# Patient Record
Sex: MALE | Race: ASIAN | NOT HISPANIC OR LATINO
[De-identification: names, ages, dates, MRNs, and addresses within clinical notes are randomized per-mention and may not be internally consistent; named-entity substitution may affect disease eponyms.]

---

## 2020-05-11 ENCOUNTER — APPOINTMENT (OUTPATIENT)
Age: 60
End: 2020-05-11
Payer: COMMERCIAL

## 2020-05-11 ENCOUNTER — TRANSCRIPTION ENCOUNTER (OUTPATIENT)
Age: 60
End: 2020-05-11

## 2020-05-11 VITALS — TEMPERATURE: 97.6 F

## 2020-05-11 DIAGNOSIS — Z87.09 PERSONAL HISTORY OF OTHER DISEASES OF THE RESPIRATORY SYSTEM: ICD-10-CM

## 2020-05-11 DIAGNOSIS — R09.81 NASAL CONGESTION: ICD-10-CM

## 2020-05-11 DIAGNOSIS — Z78.9 OTHER SPECIFIED HEALTH STATUS: ICD-10-CM

## 2020-05-11 PROCEDURE — 99203 OFFICE O/P NEW LOW 30 MIN: CPT | Mod: 95

## 2020-05-11 RX ORDER — DOXYCYCLINE 100 MG/1
100 CAPSULE ORAL
Qty: 20 | Refills: 0 | Status: ACTIVE | COMMUNITY
Start: 2020-05-11 | End: 1900-01-01

## 2020-05-11 RX ORDER — METHYLPREDNISOLONE 4 MG/1
4 TABLET ORAL
Qty: 1 | Refills: 0 | Status: ACTIVE | COMMUNITY
Start: 2020-05-11 | End: 1900-01-01

## 2020-05-11 NOTE — HISTORY OF PRESENT ILLNESS
[FreeTextEntry8] : pt. seen for acute visit, c/o cough for a week with sinus congestion, nasal stuffiness, hARD TO BREATHE. NO FEVER, CHILLS, CHEST PAIN, SORE THROAT, EAR PAIN. He had sinus infection last year too and suffers from seasonal allergies. He is taking Allgre for couple weeks and using jose pot. c/o yellow mucus. Daughter Bryant is .

## 2020-05-11 NOTE — REVIEW OF SYSTEMS
[Hearing Loss] : no hearing loss [Earache] : no earache [Shortness Of Breath] : no shortness of breath [Wheezing] : no wheezing

## 2020-05-11 NOTE — PLAN
[FreeTextEntry1] : Doxycycline and Medrol dose pack. \par Flonase nasal spray.\par  Allegra for allergies, allergist referral. daughter is asking about allergy shots.\par  f/u in office for CPE -pt. requested 06/12/2020 at 9 am.

## 2020-05-12 ENCOUNTER — APPOINTMENT (OUTPATIENT)
Dept: FAMILY MEDICINE | Facility: CLINIC | Age: 60
End: 2020-05-12

## 2020-06-12 ENCOUNTER — APPOINTMENT (OUTPATIENT)
Dept: FAMILY MEDICINE | Facility: CLINIC | Age: 60
End: 2020-06-12

## 2020-12-23 PROBLEM — Z87.09 HISTORY OF ACUTE SINUSITIS: Status: RESOLVED | Noted: 2020-05-11 | Resolved: 2020-12-23

## 2021-03-01 ENCOUNTER — APPOINTMENT (OUTPATIENT)
Dept: PHARMACY | Facility: CLINIC | Age: 61
End: 2021-03-01

## 2021-03-12 ENCOUNTER — LABORATORY RESULT (OUTPATIENT)
Age: 61
End: 2021-03-12

## 2021-03-12 ENCOUNTER — APPOINTMENT (OUTPATIENT)
Dept: INTERNAL MEDICINE | Facility: CLINIC | Age: 61
End: 2021-03-12
Payer: COMMERCIAL

## 2021-03-12 VITALS
OXYGEN SATURATION: 98 % | BODY MASS INDEX: 23.54 KG/M2 | HEART RATE: 68 BPM | HEIGHT: 67 IN | DIASTOLIC BLOOD PRESSURE: 90 MMHG | TEMPERATURE: 98.9 F | WEIGHT: 150 LBS | SYSTOLIC BLOOD PRESSURE: 156 MMHG

## 2021-03-12 VITALS — SYSTOLIC BLOOD PRESSURE: 162 MMHG | DIASTOLIC BLOOD PRESSURE: 90 MMHG

## 2021-03-12 DIAGNOSIS — Z80.0 FAMILY HISTORY OF MALIGNANT NEOPLASM OF DIGESTIVE ORGANS: ICD-10-CM

## 2021-03-12 DIAGNOSIS — Z23 ENCOUNTER FOR IMMUNIZATION: ICD-10-CM

## 2021-03-12 DIAGNOSIS — R76.8 OTHER SPECIFIED ABNORMAL IMMUNOLOGICAL FINDINGS IN SERUM: ICD-10-CM

## 2021-03-12 PROCEDURE — 99072 ADDL SUPL MATRL&STAF TM PHE: CPT

## 2021-03-12 PROCEDURE — 99204 OFFICE O/P NEW MOD 45 MIN: CPT

## 2021-03-12 NOTE — HISTORY OF PRESENT ILLNESS
[Other: _____] : [unfilled] [FreeTextEntry8] : Patient presented for the first time for transition of care, he's looking for a new PCP.  Last PE was in 2019.\par \par Pt c/o nocturia for the last 3 years.  He wakes up almost every hour per night, about 6-7 x per night.  In the summer, it's not as bad, and wake up 5 x per night.  This only occurs in the [place].  When he goes back to [facility], he only wakes up 2x per night.  No dysuria, but flow is very slow.  During the day, he goes once every 3 hours or so.   He denied caffeine, alcohol  use, and usually drink most. \par \par He has allergic rhinitis in the Spring.  He takes a pill and a nasal spray with relief. \par \par BP occ elevated at home 140-150/80-90.\par \par Pt got the COVID vaccine, but declined flu vaccine.

## 2021-03-12 NOTE — PHYSICAL EXAM
[No Acute Distress] : no acute distress [Well Nourished] : well nourished [Well Developed] : well developed [Normal Sclera/Conjunctiva] : normal sclera/conjunctiva [PERRL] : pupils equal round and reactive to light [EOMI] : extraocular movements intact [Normal Outer Ear/Nose] : the outer ears and nose were normal in appearance [Normal TMs] : both tympanic membranes were normal [No JVD] : no jugular venous distention [No Lymphadenopathy] : no lymphadenopathy [Supple] : supple [No Respiratory Distress] : no respiratory distress  [Clear to Auscultation] : lungs were clear to auscultation bilaterally [Normal Rate] : normal rate  [Regular Rhythm] : with a regular rhythm [Normal S1, S2] : normal S1 and S2 [No Edema] : there was no peripheral edema [No Extremity Clubbing/Cyanosis] : no extremity clubbing/cyanosis [Soft] : abdomen soft [Non Tender] : non-tender [Normal Bowel Sounds] : normal bowel sounds [Normal Sphincter Tone] : normal sphincter tone [No Mass] : no mass [Prostate Enlarged] : was enlarged [Normal Supraclavicular Nodes] : no supraclavicular lymphadenopathy [Normal Posterior Cervical Nodes] : no posterior cervical lymphadenopathy [Normal Anterior Cervical Nodes] : no anterior cervical lymphadenopathy [No CVA Tenderness] : no CVA  tenderness [No Spinal Tenderness] : no spinal tenderness [Speech Grossly Normal] : speech grossly normal [Normal Affect] : the affect was normal [Alert and Oriented x3] : oriented to person, place, and time [Normal Mood] : the mood was normal [Stool Occult Blood] : stool negative for occult blood [Prostate Nodule] : did not have a nodule [Prostate Tenderness] : was not tender [de-identified] : male in stated age,  [de-identified] : Pt was fluent in [place]

## 2021-03-12 NOTE — ASSESSMENT
[FreeTextEntry1] : Pt is overdue for colonoscopy, and will need to consult with GI.  He also needs routine eye exam and dental care.  I gave him Rx to check routine labs, which he will do prior to next OV which should be scheduled as a CPE.

## 2021-03-15 LAB
25(OH)D3 SERPL-MCNC: 35.7 NG/ML
ALBUMIN SERPL ELPH-MCNC: 4.3 G/DL
ALP BLD-CCNC: 86 U/L
ALT SERPL-CCNC: 17 U/L
ANION GAP SERPL CALC-SCNC: 8 MMOL/L
APPEARANCE: CLEAR
AST SERPL-CCNC: 20 U/L
BASOPHILS # BLD AUTO: 0.05 K/UL
BASOPHILS NFR BLD AUTO: 0.8 %
BILIRUB SERPL-MCNC: 1.2 MG/DL
BILIRUBIN URINE: NEGATIVE
BLOOD URINE: ABNORMAL
BUN SERPL-MCNC: 12 MG/DL
CALCIUM SERPL-MCNC: 9.1 MG/DL
CHLORIDE SERPL-SCNC: 104 MMOL/L
CHOLEST SERPL-MCNC: 207 MG/DL
CO2 SERPL-SCNC: 28 MMOL/L
COLOR: NORMAL
CREAT SERPL-MCNC: 0.83 MG/DL
EOSINOPHIL # BLD AUTO: 0.09 K/UL
EOSINOPHIL NFR BLD AUTO: 1.4 %
ESTIMATED AVERAGE GLUCOSE: 103 MG/DL
GLUCOSE QUALITATIVE U: NEGATIVE
GLUCOSE SERPL-MCNC: 88 MG/DL
HBA1C MFR BLD HPLC: 5.2 %
HCT VFR BLD CALC: 45.7 %
HDLC SERPL-MCNC: 39 MG/DL
HGB BLD-MCNC: 15 G/DL
IMM GRANULOCYTES NFR BLD AUTO: 0.3 %
KETONES URINE: NEGATIVE
LDLC SERPL CALC-MCNC: 136 MG/DL
LEUKOCYTE ESTERASE URINE: NEGATIVE
LYMPHOCYTES # BLD AUTO: 2.01 K/UL
LYMPHOCYTES NFR BLD AUTO: 30.2 %
MAN DIFF?: NORMAL
MCHC RBC-ENTMCNC: 29 PG
MCHC RBC-ENTMCNC: 32.8 GM/DL
MCV RBC AUTO: 88.4 FL
MONOCYTES # BLD AUTO: 0.46 K/UL
MONOCYTES NFR BLD AUTO: 6.9 %
NEUTROPHILS # BLD AUTO: 4.02 K/UL
NEUTROPHILS NFR BLD AUTO: 60.4 %
NITRITE URINE: NEGATIVE
NONHDLC SERPL-MCNC: 168 MG/DL
PH URINE: 6
PLATELET # BLD AUTO: 279 K/UL
POTASSIUM SERPL-SCNC: 4 MMOL/L
PROT SERPL-MCNC: 7.3 G/DL
PROTEIN URINE: NEGATIVE
PSA SERPL-MCNC: 0.73 NG/ML
RBC # BLD: 5.17 M/UL
RBC # FLD: 12 %
SODIUM SERPL-SCNC: 139 MMOL/L
SPECIFIC GRAVITY URINE: 1.01
TRIGL SERPL-MCNC: 161 MG/DL
UROBILINOGEN URINE: NORMAL
WBC # FLD AUTO: 6.65 K/UL

## 2021-04-01 ENCOUNTER — APPOINTMENT (OUTPATIENT)
Dept: INTERNAL MEDICINE | Facility: CLINIC | Age: 61
End: 2021-04-01
Payer: COMMERCIAL

## 2021-04-01 ENCOUNTER — NON-APPOINTMENT (OUTPATIENT)
Age: 61
End: 2021-04-01

## 2021-04-01 VITALS
OXYGEN SATURATION: 98 % | SYSTOLIC BLOOD PRESSURE: 114 MMHG | HEART RATE: 81 BPM | TEMPERATURE: 98.1 F | BODY MASS INDEX: 23.54 KG/M2 | HEIGHT: 67 IN | WEIGHT: 150 LBS | DIASTOLIC BLOOD PRESSURE: 72 MMHG

## 2021-04-01 VITALS — DIASTOLIC BLOOD PRESSURE: 76 MMHG | SYSTOLIC BLOOD PRESSURE: 112 MMHG

## 2021-04-01 DIAGNOSIS — Z78.9 OTHER SPECIFIED HEALTH STATUS: ICD-10-CM

## 2021-04-01 DIAGNOSIS — J47.9 BRONCHIECTASIS, UNCOMPLICATED: ICD-10-CM

## 2021-04-01 DIAGNOSIS — Z00.00 ENCOUNTER FOR GENERAL ADULT MEDICAL EXAMINATION W/OUT ABNORMAL FINDINGS: ICD-10-CM

## 2021-04-01 DIAGNOSIS — Z87.891 PERSONAL HISTORY OF NICOTINE DEPENDENCE: ICD-10-CM

## 2021-04-01 PROCEDURE — 99396 PREV VISIT EST AGE 40-64: CPT

## 2021-04-01 PROCEDURE — 99072 ADDL SUPL MATRL&STAF TM PHE: CPT

## 2021-04-01 NOTE — ASSESSMENT
[FreeTextEntry1] : Patient was again reminded that he needs a screening colonoscopy, and that he should consult with GI.  He was reminded to have routine eye exam and dental care.

## 2021-04-01 NOTE — REVIEW OF SYSTEMS
[Nocturia] : nocturia [Negative] : Heme/Lymph [Fever] : no fever [Fatigue] : no fatigue [Chills] : no chills [Recent Change In Weight] : ~T no recent weight change [Chest Pain] : no chest pain [Palpitations] : no palpitations [Claudication] : no  leg claudication [Paroxysmal Nocturnal Dyspnea] : no paroxysmal nocturnal dyspnea [Wheezing] : no wheezing [Shortness Of Breath] : no shortness of breath [Cough] : no cough [Dyspnea on Exertion] : not dyspnea on exertion [Abdominal Pain] : no abdominal pain [Nausea] : no nausea [Constipation] : no constipation [Diarrhea] : no diarrhea [Vomiting] : no vomiting [Heartburn] : no heartburn [Melena] : no melena [Dysuria] : no dysuria [Incontinence] : no incontinence [Hematuria] : no hematuria [Joint Pain] : no joint pain [Joint Stiffness] : no joint stiffness [Muscle Pain] : no muscle pain [Back Pain] : no back pain [Joint Swelling] : no joint swelling [Itching] : no itching [Mole Changes] : no mole changes [Skin Rash] : no skin rash [Headache] : no headache [Dizziness] : no dizziness [Fainting] : no fainting [Unsteady Walk] : no ataxia [Insomnia] : no insomnia [Anxiety] : no anxiety [Depression] : no depression [Easy Bleeding] : no easy bleeding [Easy Bruising] : no easy bruising [Swollen Glands] : no swollen glands [FreeTextEntry4] : tinnitus, [FreeTextEntry3] : wears reading glasses [FreeTextEntry8] : Nocturia of 3-4 X per night,

## 2021-04-01 NOTE — HEALTH RISK ASSESSMENT
[Excellent] : ~his/her~  mood as  excellent [Never (0 pts)] : Never (0 points) [No] : In the past 12 months have you used drugs other than those required for medical reasons? No [No falls in past year] : Patient reported no falls in the past year [0] : 2) Feeling down, depressed, or hopeless: Not at all (0) [None] : None [With Family] : lives with family [# of Members in Household ___] :  household currently consist of [unfilled] member(s) [Retired] : retired [Less Than High School] : less than high school [] :  [# Of Children ___] : has [unfilled] children [Feels Safe at Home] : Feels safe at home [Fully functional (bathing, dressing, toileting, transferring, walking, feeding)] : Fully functional (bathing, dressing, toileting, transferring, walking, feeding) [Fully functional (using the telephone, shopping, preparing meals, housekeeping, doing laundry, using] : Fully functional and needs no help or supervision to perform IADLs (using the telephone, shopping, preparing meals, housekeeping, doing laundry, using transportation, managing medications and managing finances) [Smoke Detector] : smoke detector [Carbon Monoxide Detector] : carbon monoxide detector [Seat Belt] :  uses seat belt [] : No [de-identified] : walks every day [[id number]] : 0 [EyeExamDate] : 2019 [Change in mental status noted] : No change in mental status noted [Reports changes in hearing] : Reports no changes in hearing [Reports changes in vision] : Reports no changes in vision [Reports changes in dental health] : Reports no changes in dental health [ColonoscopyDate] : Never [de-identified] : dentist - 2018

## 2021-04-01 NOTE — PHYSICAL EXAM
[No Acute Distress] : no acute distress [Well Nourished] : well nourished [Well Developed] : well developed [Normal Sclera/Conjunctiva] : normal sclera/conjunctiva [PERRL] : pupils equal round and reactive to light [EOMI] : extraocular movements intact [Normal Outer Ear/Nose] : the outer ears and nose were normal in appearance [Normal Oropharynx] : the oropharynx was normal [Normal TMs] : both tympanic membranes were normal [Normal Nasal Mucosa] : the nasal mucosa was normal [No JVD] : no jugular venous distention [No Lymphadenopathy] : no lymphadenopathy [Supple] : supple [No Respiratory Distress] : no respiratory distress  [Clear to Auscultation] : lungs were clear to auscultation bilaterally [Normal Rate] : normal rate  [Regular Rhythm] : with a regular rhythm [Normal S1, S2] : normal S1 and S2 [No Carotid Bruits] : no carotid bruits [Pedal Pulses Present] : the pedal pulses are present [No Edema] : there was no peripheral edema [No Extremity Clubbing/Cyanosis] : no extremity clubbing/cyanosis [Normal Appearance] : normal in appearance [No Masses] : no palpable masses [No Nipple Discharge] : no nipple discharge [No Axillary Lymphadenopathy] : no axillary lymphadenopathy [Soft] : abdomen soft [Non Tender] : non-tender [Non-distended] : non-distended [Normal Bowel Sounds] : normal bowel sounds [Declined Rectal Exam] : declined rectal exam [Normal Supraclavicular Nodes] : no supraclavicular lymphadenopathy [Normal Axillary Nodes] : no axillary lymphadenopathy [Normal Posterior Cervical Nodes] : no posterior cervical lymphadenopathy [Normal Anterior Cervical Nodes] : no anterior cervical lymphadenopathy [No CVA Tenderness] : no CVA  tenderness [No Spinal Tenderness] : no spinal tenderness [No Joint Swelling] : no joint swelling [Grossly Normal Strength/Tone] : grossly normal strength/tone [No Rash] : no rash [Coordination Grossly Intact] : coordination grossly intact [No Focal Deficits] : no focal deficits [Normal Gait] : normal gait [Speech Grossly Normal] : speech grossly normal [Normal Affect] : the affect was normal [Alert and Oriented x3] : oriented to person, place, and time [Normal Mood] : the mood was normal [de-identified] : male in stated age,  [FreeTextEntry1] : deferred, exam at last OV 3 weeks ago,

## 2021-04-01 NOTE — HISTORY OF PRESENT ILLNESS
[Other: _____] : [unfilled] [FreeTextEntry1] : Pt presented for PE.  Last PE was in 2019 with another MD. [de-identified] : Pt was placed on Cardura  about 3 weeks ago when we first met for HTN and LUTS, he tolerated meds well without any SE.   It reduced the frequency of nocturia to 3-4 x per night, was previously 5-6 x per night.\par \par He has been taking Claritin and that is helping his allergy symptoms.\par \par Pt feels well without any other complaint.

## 2021-04-06 RX ORDER — DOXAZOSIN 2 MG/1
2 TABLET ORAL DAILY
Qty: 90 | Refills: 1 | Status: ACTIVE | COMMUNITY
Start: 2021-03-12 | End: 1900-01-01

## 2021-04-18 ENCOUNTER — INPATIENT (INPATIENT)
Facility: HOSPITAL | Age: 61
LOS: 1 days | Discharge: HOME | DRG: 603 | End: 2021-04-20
Attending: HOSPITALIST | Admitting: HOSPITALIST
Payer: COMMERCIAL

## 2021-04-18 VITALS
HEART RATE: 76 BPM | OXYGEN SATURATION: 98 % | RESPIRATION RATE: 14 BRPM | WEIGHT: 149.03 LBS | TEMPERATURE: 98 F | DIASTOLIC BLOOD PRESSURE: 78 MMHG | HEIGHT: 67 IN | SYSTOLIC BLOOD PRESSURE: 114 MMHG

## 2021-04-18 DIAGNOSIS — L03.90 CELLULITIS, UNSPECIFIED: ICD-10-CM

## 2021-04-18 LAB
ALBUMIN SERPL ELPH-MCNC: 3.5 G/DL — SIGNIFICANT CHANGE UP (ref 3.3–5)
ALP SERPL-CCNC: 86 U/L — SIGNIFICANT CHANGE UP (ref 30–120)
ALT FLD-CCNC: 30 U/L DA — SIGNIFICANT CHANGE UP (ref 10–60)
ANION GAP SERPL CALC-SCNC: 7 MMOL/L — SIGNIFICANT CHANGE UP (ref 5–17)
APPEARANCE UR: CLEAR — SIGNIFICANT CHANGE UP
APTT BLD: 31.5 SEC — SIGNIFICANT CHANGE UP (ref 27.5–35.5)
AST SERPL-CCNC: 19 U/L — SIGNIFICANT CHANGE UP (ref 10–40)
BACTERIA # UR AUTO: ABNORMAL
BASOPHILS # BLD AUTO: 0.06 K/UL — SIGNIFICANT CHANGE UP (ref 0–0.2)
BASOPHILS NFR BLD AUTO: 0.5 % — SIGNIFICANT CHANGE UP (ref 0–2)
BILIRUB SERPL-MCNC: 1.1 MG/DL — SIGNIFICANT CHANGE UP (ref 0.2–1.2)
BILIRUB UR-MCNC: NEGATIVE — SIGNIFICANT CHANGE UP
BUN SERPL-MCNC: 14 MG/DL — SIGNIFICANT CHANGE UP (ref 7–23)
CALCIUM SERPL-MCNC: 8.8 MG/DL — SIGNIFICANT CHANGE UP (ref 8.4–10.5)
CHLORIDE SERPL-SCNC: 103 MMOL/L — SIGNIFICANT CHANGE UP (ref 96–108)
CO2 SERPL-SCNC: 27 MMOL/L — SIGNIFICANT CHANGE UP (ref 22–31)
COD CRY URNS QL: ABNORMAL
COLOR SPEC: YELLOW — SIGNIFICANT CHANGE UP
CREAT SERPL-MCNC: 0.98 MG/DL — SIGNIFICANT CHANGE UP (ref 0.5–1.3)
CRP SERPL-MCNC: 37 MG/L — HIGH
DIFF PNL FLD: NEGATIVE — SIGNIFICANT CHANGE UP
EOSINOPHIL # BLD AUTO: 0.12 K/UL — SIGNIFICANT CHANGE UP (ref 0–0.5)
EOSINOPHIL NFR BLD AUTO: 1 % — SIGNIFICANT CHANGE UP (ref 0–6)
GLUCOSE SERPL-MCNC: 121 MG/DL — HIGH (ref 70–99)
GLUCOSE UR QL: NEGATIVE MG/DL — SIGNIFICANT CHANGE UP
HCT VFR BLD CALC: 43.3 % — SIGNIFICANT CHANGE UP (ref 39–50)
HGB BLD-MCNC: 14.5 G/DL — SIGNIFICANT CHANGE UP (ref 13–17)
IMM GRANULOCYTES NFR BLD AUTO: 0.4 % — SIGNIFICANT CHANGE UP (ref 0–1.5)
INR BLD: 1.1 RATIO — SIGNIFICANT CHANGE UP (ref 0.88–1.16)
KETONES UR-MCNC: NEGATIVE — SIGNIFICANT CHANGE UP
LACTATE SERPL-SCNC: 0.8 MMOL/L — SIGNIFICANT CHANGE UP (ref 0.7–2)
LEUKOCYTE ESTERASE UR-ACNC: ABNORMAL
LYMPHOCYTES # BLD AUTO: 19.4 % — SIGNIFICANT CHANGE UP (ref 13–44)
LYMPHOCYTES # BLD AUTO: 2.38 K/UL — SIGNIFICANT CHANGE UP (ref 1–3.3)
MCHC RBC-ENTMCNC: 29.4 PG — SIGNIFICANT CHANGE UP (ref 27–34)
MCHC RBC-ENTMCNC: 33.5 GM/DL — SIGNIFICANT CHANGE UP (ref 32–36)
MCV RBC AUTO: 87.8 FL — SIGNIFICANT CHANGE UP (ref 80–100)
MONOCYTES # BLD AUTO: 0.92 K/UL — HIGH (ref 0–0.9)
MONOCYTES NFR BLD AUTO: 7.5 % — SIGNIFICANT CHANGE UP (ref 2–14)
NEUTROPHILS # BLD AUTO: 8.75 K/UL — HIGH (ref 1.8–7.4)
NEUTROPHILS NFR BLD AUTO: 71.2 % — SIGNIFICANT CHANGE UP (ref 43–77)
NITRITE UR-MCNC: NEGATIVE — SIGNIFICANT CHANGE UP
NRBC # BLD: 0 /100 WBCS — SIGNIFICANT CHANGE UP (ref 0–0)
PH UR: 5 — SIGNIFICANT CHANGE UP (ref 5–8)
PLATELET # BLD AUTO: 240 K/UL — SIGNIFICANT CHANGE UP (ref 150–400)
POTASSIUM SERPL-MCNC: 3.9 MMOL/L — SIGNIFICANT CHANGE UP (ref 3.5–5.3)
POTASSIUM SERPL-SCNC: 3.9 MMOL/L — SIGNIFICANT CHANGE UP (ref 3.5–5.3)
PROT SERPL-MCNC: 7.8 G/DL — SIGNIFICANT CHANGE UP (ref 6–8.3)
PROT UR-MCNC: 30 MG/DL
PROTHROM AB SERPL-ACNC: 13.2 SEC — SIGNIFICANT CHANGE UP (ref 10.6–13.6)
RBC # BLD: 4.93 M/UL — SIGNIFICANT CHANGE UP (ref 4.2–5.8)
RBC # FLD: 12.1 % — SIGNIFICANT CHANGE UP (ref 10.3–14.5)
SARS-COV-2 RNA SPEC QL NAA+PROBE: SIGNIFICANT CHANGE UP
SODIUM SERPL-SCNC: 137 MMOL/L — SIGNIFICANT CHANGE UP (ref 135–145)
SP GR SPEC: 1.02 — SIGNIFICANT CHANGE UP (ref 1.01–1.02)
TSH SERPL-MCNC: 0.9 UIU/ML — SIGNIFICANT CHANGE UP (ref 0.27–4.2)
UROBILINOGEN FLD QL: 1 MG/DL
WBC # BLD: 12.28 K/UL — HIGH (ref 3.8–10.5)
WBC # FLD AUTO: 12.28 K/UL — HIGH (ref 3.8–10.5)
WBC UR QL: SIGNIFICANT CHANGE UP

## 2021-04-18 PROCEDURE — 93010 ELECTROCARDIOGRAM REPORT: CPT

## 2021-04-18 PROCEDURE — 99223 1ST HOSP IP/OBS HIGH 75: CPT

## 2021-04-18 PROCEDURE — 73080 X-RAY EXAM OF ELBOW: CPT | Mod: 26,RT

## 2021-04-18 PROCEDURE — 99285 EMERGENCY DEPT VISIT HI MDM: CPT

## 2021-04-18 PROCEDURE — 71045 X-RAY EXAM CHEST 1 VIEW: CPT | Mod: 26

## 2021-04-18 RX ORDER — VANCOMYCIN HCL 1 G
1000 VIAL (EA) INTRAVENOUS ONCE
Refills: 0 | Status: COMPLETED | OUTPATIENT
Start: 2021-04-18 | End: 2021-04-18

## 2021-04-18 RX ORDER — SODIUM CHLORIDE 9 MG/ML
2100 INJECTION INTRAMUSCULAR; INTRAVENOUS; SUBCUTANEOUS ONCE
Refills: 0 | Status: COMPLETED | OUTPATIENT
Start: 2021-04-18 | End: 2021-04-18

## 2021-04-18 RX ORDER — ENOXAPARIN SODIUM 100 MG/ML
40 INJECTION SUBCUTANEOUS EVERY 24 HOURS
Refills: 0 | Status: DISCONTINUED | OUTPATIENT
Start: 2021-04-18 | End: 2021-04-20

## 2021-04-18 RX ORDER — VANCOMYCIN HCL 1 G
1000 VIAL (EA) INTRAVENOUS EVERY 12 HOURS
Refills: 0 | Status: DISCONTINUED | OUTPATIENT
Start: 2021-04-18 | End: 2021-04-20

## 2021-04-18 RX ADMIN — ENOXAPARIN SODIUM 40 MILLIGRAM(S): 100 INJECTION SUBCUTANEOUS at 22:08

## 2021-04-18 RX ADMIN — Medication 1000 MILLIGRAM(S): at 12:25

## 2021-04-18 RX ADMIN — SODIUM CHLORIDE 2100 MILLILITER(S): 9 INJECTION INTRAMUSCULAR; INTRAVENOUS; SUBCUTANEOUS at 11:00

## 2021-04-18 RX ADMIN — SODIUM CHLORIDE 2100 MILLILITER(S): 9 INJECTION INTRAMUSCULAR; INTRAVENOUS; SUBCUTANEOUS at 12:26

## 2021-04-18 RX ADMIN — Medication 250 MILLIGRAM(S): at 22:08

## 2021-04-18 RX ADMIN — Medication 250 MILLIGRAM(S): at 11:00

## 2021-04-18 NOTE — PATIENT PROFILE ADULT - HISTORY OF COVID-19 VACCINATION
Soft-Textured, Child[place] Diet: Care Instructions  Your Care Instructions  A soft-textured, bland diet is used when you need food that is easy to chew, swallow, and digest. You will need to choose soft foods that are low in spices and seasonings. You will need to avoid high-fat foods, as well as caffeine and alcohol. Your doctor or dietitian can help you plan a soft-textured, bland diet based on your health and what you prefer to eat. Ask your doctor how long you should stay on this diet. As you get better, you will probably be able to go back to a regular diet. Talk with your doctor or dietitian before you make changes in your diet. Follow-up care is a key part of your treatment and safety. Be sure to make and go to all appointments, and call your doctor if you are having problems. Its also a good idea to know your test results and keep a list of the medicines you take. How can you care for yourself at home? · Choose foods that are easy to chew and swallow. Good choices are mashed potatoes, soft breads and rolls, cream soups, oatmeal, and Cream of Wheat. · Choose soft, well-cooked vegetables and soft or canned fruits. Good choices are applesauce, ripe bananas, and non-citrus fruit juice. · Try milk, yogurt, or other milk products, if you can digest dairy without too many problems. Your doctor may limit milk and milk products for a while. If so, he or she may recommend a calcium and vitamin D supplement. · Choose soft protein foods such as eggs, tofu, steamed fish, chicken, and turkey. Slow-cooking methods, such as stewing, will help soften meat. Chopping meat in a  or  also will make it easier to eat. · Avoid nuts, raw vegetables, hard crackers, tough meats, and prunes and prune juice. · Avoid foods that are very spicy, such as foods seasoned with black pepper, chili peppers, horseradish, or hot sauce.   · Avoid highly acidic foods such as citrus fruits, citrus fruit juices, and tomato-based foods. · Avoid high-fat foods such as fried meat, chips, and rich desserts. · Check with your doctor before you drink alcohol or beverages that have caffeine, such as coffee, tea, and cola beverages. Where can you learn more? Go to http://chet-artur.info/. Enter N293 in the search box to learn more about \"Soft-Textured, Glorya Bouillon Diet: Care Instructions. \"  Current as of: July 26, 2016  Content Version: 11.1  © 9897-7848 [employer]. Care instructions adapted under license by [employer] (which disclaims liability or warranty for this information). If you have questions about a medical condition or this instruction, always ask your healthcare professional. [address] any warranty or liability for your use of this information. Ranitidine (By mouth)   Ranitidine Hydrochloride ([id number]-ZO-[id number]-lovely joel-droe-KLOR-minoo)  Treats and prevents heartburn. Also treats stomach ulcers, gastroesophageal reflux disease (GERD), and conditions that cause too much stomach acid. Brand Name(s):DermaSilkRx Anodynexa John, DermacinRx Inflammatral John, FusePaq Deprizine, [facility] Acid Control 150, [facility] Staxxon, Leader Acid Control, Leader Ranitidine HCl, [facility] Acid Reducer, [employer] Acid Reducer, TopCare Heartburn Relief 150, TopCare Heartburn Relief 75, Zantac, Zantac 150, Zantac 300, Zantac 75   There may be other brand names for this medicine. When This Medicine Should Not Be Used: This medicine is not right for everyone. Do not use it if you had an allergic reaction to ranitidine. How to Use This Medicine:   Capsule, Tablet, Liquid, Fizzy Tablet, Liquid Filled Capsule, Granule  · Your doctor will tell you how much medicine to use. Do not use more than directed. · Follow the instructions on the medicine label if you are using this medicine without a prescription.   · Measure the oral liquid medicine with a marked measuring spoon, oral syringe, or medicine cup. · The effervescent tablet should not be chewed, swallowed whole, or dissolved on the tongue. The Zantac 25 EFFERdose Tablet should be mixed in 1 teaspoon (or more) of water. Do not drink the liquid until the tablet is completely dissolved. · If you are using this medicine to prevent heartburn, take it 30 to 60 minutes before you eat or drink anything that causes you to have heartburn. · Missed dose: Take a dose as soon as you remember. If it is almost time for your next dose, wait until then and take a regular dose. Do not take extra medicine to make up for a missed dose. · Store the medicine in a closed container at room temperature, away from heat, moisture, and direct light. You may store the oral liquid in the refrigerator. Drugs and Foods to Avoid:   Ask your doctor or pharmacist before using any other medicine, including over-the-counter medicines, vitamins, and herbal products. · Some medicines can affect how ranitidine works. Tell your doctor if you are using the following:   ¨ Atazanavir  ¨ Delavirdine  ¨ Gefitinib  ¨ Glipizide  ¨ Ketoconazole  ¨ Midazolam  ¨ Triazolam  ¨ Warfarin  Warnings While Using This Medicine:   · Tell your doctor if you are pregnant or breastfeeding, or if you have kidney disease, liver disease, or a history of acute porphyria. · EFFERdose® tablets contain phenylalanine. If you have phenylketonuria (PKU), talk to your doctor before you use this medicine. · Tell any doctor or dentist who treats you that you are using this medicine. This medicine may affect certain medical test results. · Keep all medicine out of the reach of children. Never share your medicine with anyone.   Possible Side Effects While Using This Medicine:   Call your doctor right away if you notice any of these side effects:  · Allergic reaction: Itching or hives, swelling in your face or hands, swelling or tingling in your mouth or throat, chest tightness, trouble breathing  · Blistering, peeling, red skin rash  · Dark urine or pale stools, nausea, vomiting, loss of appetite, stomach pain, yellow skin or eyes  · Fast, slow, or uneven heartbeat  · Unusual bleeding, bruising, or weakness  If you notice these less serious side effects, talk with your doctor:   · Constipation or diarrhea  · Headache  · Mild nausea, vomiting, or stomach pain  If you notice other side effects that you think are caused by this medicine, tell your doctor. Call your doctor for medical advice about side effects. You may report side effects to FDA at 7-374-QRI-3135  © 2016 [address] is for End User's use only and may not be sold, redistributed or otherwise used for commercial purposes. The above information is an  only. It is not intended as medical advice for individual conditions or treatments. Talk to your doctor, nurse or pharmacist before following any medical regimen to see if it is safe and effective for you. Yes

## 2021-04-18 NOTE — H&P ADULT - TIME BILLING
Greater than 75 minutes spent on patient encounter, activities included direct patient care, counceling and or coordinating care , reviewing notes, lab data/imaging, and discussion with multidiscplinary team

## 2021-04-18 NOTE — ED PROVIDER NOTE - OBJECTIVE STATEMENT
62 yo male with hx of HTN co abscess and infection rt elbow for several days. States through son-in-law as , he noted a painful red swelling with purulence rt elbow 3 days ago. Today abscess popped and swelling went down. Was seen at  today and recommended admission for IV antibiotics. Pt denies fever, pain, cough, or other symptom. Has PCP Tami in [place].   Had COVID vaccine last month.

## 2021-04-18 NOTE — H&P ADULT - NSHPLABSRESULTS_GEN_ALL_CORE
Labs:                          14.5   12.28 )-----------( 240      ( 18 Apr 2021 11:05 )             43.3     04-18    137  |  103  |  14  ----------------------------<  121<H>  3.9   |  27  |  0.98    Ca    8.8      18 Apr 2021 11:05    TPro  7.8  /  Alb  3.5  /  TBili  1.1  /  DBili  x   /  AST  19  /  ALT  30  /  AlkPhos  86  04-18    LIVER FUNCTIONS - ( 18 Apr 2021 11:05 )  Alb: 3.5 g/dL / Pro: 7.8 g/dL / ALK PHOS: 86 U/L / ALT: 30 U/L DA / AST: 19 U/L / GGT: x           PT/INR - ( 18 Apr 2021 11:05 )   PT: 13.2 sec;   INR: 1.10 ratio         PTT - ( 18 Apr 2021 11:05 )  PTT:31.5 sec      Active Medications  MEDICATIONS  (STANDING):  enoxaparin Injectable 40 milliGRAM(s) SubCutaneous every 24 hours  vancomycin  IVPB 1000 milliGRAM(s) IV Intermittent every 12 hours    MEDICATIONS  (PRN):

## 2021-04-18 NOTE — ED ADULT NURSE NOTE - OBJECTIVE STATEMENT
pt has skin tear to right a/c region and today noticed blister medial to it and it burst also with surrounding erythema and warmth oozing serous fluid  and pain radiates to right axillary region  redness marked off and dsd to area

## 2021-04-18 NOTE — H&P ADULT - HISTORY OF PRESENT ILLNESS
62 yo male with hx of HTN co abscess and infection rt elbow for several days. States through son-in-law as , he noted a painful red swelling with purulence rt elbow 3 days ago. Today abscess popped and swelling went down. Was seen at  today and recommended admission for IV antibiotics. Pt denies fever, pain, cough, or other symptom. Has PCP Tami in [place].     ER course:  VSS afebrile  XR negative for Gas or fluid collection in Joint  Labs:benign  Intervention: Pulm called for evaluation.   62 yo Vietamese male with hx of HTN co abscess and infection rt elbow for several days. States through son-in-law as , he noted a painful red swelling with purulence rt elbow 3 days ago. Today abscess popped and swelling went down. Was seen at  today and recommended admission for IV antibiotics. Pt denies fever, pain, cough, or other symptom. Has PCP Tami in [place].     ER course:  VSS afebrile  XR negative for Gas or fluid collection in Joint  Labs:benign  Intervention: Pulm called for evaluation.   62 yo [place]ames[place] male with hx of HTN presents to the ER with right elbow pain,  and redness. Symptoms started on Thursday. notably, as patient speaks vietamese, patient requested to use daughter as .  Patient reports redness around the arm plus a formation of new abscess.  denies trauma to the area nor infectious exposure.  Today abscess popped and swelling went down. Was seen at Urgent care today and recommended admission for IV antibiotics.   on ROS: Pt denies fever, pain, cough, or other symptoms  ER course:  VSS afebrile  XR negative for Gas or fluid collection in Joint  Labs:benign  Intervention: Pulm called for evaluation.

## 2021-04-18 NOTE — ED PROVIDER NOTE - CLINICAL SUMMARY MEDICAL DECISION MAKING FREE TEXT BOX
Rtt elbow abscess spontaneously drained now with cellulitis. Plan - Sepsis workup, IV antibiotics. Likely admission for further monitoring.

## 2021-04-18 NOTE — H&P ADULT - NSHPPHYSICALEXAM_GEN_ALL_CORE
Objective:    Vitals:  T(C): 36.7 (04-18-21 @ 12:24), Max: 36.7 (04-18-21 @ 10:26)  HR: 75 (04-18-21 @ 12:24) (75 - 76)  BP: 121/75 (04-18-21 @ 12:24) (114/78 - 121/75)  RR: 14 (04-18-21 @ 12:24) (14 - 14)  SpO2: 99% (04-18-21 @ 12:24) (98% - 99%)    Physical Exam:  General: comfortable, no acute distress, well nourished  HEENT: Atraumatic, no LAD, trachea midline, PERRLA  Cardiovascular: normal s1s2, no murmurs, gallops or fricition rubs  Pulmonary: clear to ausculation Bilaterally, no wheezing , rhonchi  Gastrointestinal: soft non tender non distended, no masses felt, no organomegally  Muscloskeletal: no lower extremity edema, intact bilateral lower extremity pulses  Neurological: CN II-12 intact. No focal weakness  Psychiatrical: normal mood, cooperative  SKIN: no rash, lesions or ulcers Objective:    Vitals:  T(C): 36.7 (04-18-21 @ 12:24), Max: 36.7 (04-18-21 @ 10:26)  HR: 75 (04-18-21 @ 12:24) (75 - 76)  BP: 121/75 (04-18-21 @ 12:24) (114/78 - 121/75)  RR: 14 (04-18-21 @ 12:24) (14 - 14)  SpO2: 99% (04-18-21 @ 12:24) (98% - 99%)    Physical Exam:  General: comfortable, no acute distress, well nourished  HEENT: Atraumatic, no LAD, trachea midline, PERRLA  Cardiovascular: normal s1s2, no murmurs, gallops or fricition rubs  Pulmonary: clear to ausculation Bilaterally, no wheezing , rhonchi  Gastrointestinal: soft non tender non distended, no masses felt, no organomegally  Muscloskeletal: no lower extremity edema, intact bilateral lower extremity pulses  RIGHT FOREARM: Skin tag noted + erthymatous cellulitic pattern banded around forearm. 4 mm exposed dermal layer, + minor purulence noted  Neurological: CN II-12 intact. No focal weakness  Psychiatrical: normal mood, cooperative  SKIN: no rash, lesions or ulcers

## 2021-04-18 NOTE — ED ADULT NURSE NOTE - NSFALLRSKINDICATORS_ED_ALL_ED
DONTRELL Rojas M.A.    
no

## 2021-04-18 NOTE — ED PROVIDER NOTE - SKIN COLOR
Pt. willingly signed in voluntarily. Pt. Stated he would stay to get help for his depression and noted that the doctor was going to increase his medications.    normal for race

## 2021-04-18 NOTE — ED PROVIDER NOTE - MUSCULOSKELETAL, MLM
Spine appears normal, range of motion is not limited, no muscle or joint tenderness. Swelling and redness rt elbow antecubital fossa.

## 2021-04-18 NOTE — H&P ADULT - ASSESSMENT
62 yo male with hx of HTN admitted to  ED for evaluation of right elbow pain and swelling. Patient found to have a right superficial elbow abscess.  self drained while in ED. Course complicated by superficial cellulitis. Admitted to medicine for further care    Right elbow abscess  Pulm consulted  ID consulted  Patient started on Vanco  Wound cultures ordered    HTN  resume home meds    dvt ppx: lovenox 62 yo male with hx of HTN admitted to  ED for evaluation of right elbow pain and swelling. Patient found to have a right superficial elbow abscess.  self drained while in ED. Course complicated by superficial cellulitis. Admitted to medicine for further care    Right elbow abscess , self drained c/b peripheral cellulitis  Pulm consulted  ID consulted  Patient started on Vanco  Wound cultures ordered  Surgery consulted over the phone. discussed case, recommended outpatient followup in 1 to two weeks    HTN  resume home meds    dvt ppx: lovenox

## 2021-04-19 LAB
COVID-19 SPIKE DOMAIN AB INTERP: POSITIVE
COVID-19 SPIKE DOMAIN ANTIBODY RESULT: 15.6 U/ML — HIGH
CULTURE RESULTS: NO GROWTH — SIGNIFICANT CHANGE UP
MRSA PCR RESULT.: SIGNIFICANT CHANGE UP
S AUREUS DNA NOSE QL NAA+PROBE: DETECTED
SARS-COV-2 IGG+IGM SERPL QL IA: 15.6 U/ML — HIGH
SARS-COV-2 IGG+IGM SERPL QL IA: POSITIVE
SPECIMEN SOURCE: SIGNIFICANT CHANGE UP
VANCOMYCIN TROUGH SERPL-MCNC: 10.8 UG/ML — SIGNIFICANT CHANGE UP (ref 10–20)

## 2021-04-19 PROCEDURE — 99233 SBSQ HOSP IP/OBS HIGH 50: CPT

## 2021-04-19 RX ADMIN — ENOXAPARIN SODIUM 40 MILLIGRAM(S): 100 INJECTION SUBCUTANEOUS at 21:58

## 2021-04-19 RX ADMIN — Medication 250 MILLIGRAM(S): at 10:54

## 2021-04-19 RX ADMIN — Medication 250 MILLIGRAM(S): at 22:03

## 2021-04-19 NOTE — PROGRESS NOTE ADULT - TIME BILLING
Greater than 45 minute spent on patient encounter, activities included direct patient care, counceling and or coordinating care , reviewing notes, lab data/imaging, and discussion with multidiscplinary team

## 2021-04-19 NOTE — CONSULT NOTE ADULT - SUBJECTIVE AND OBJECTIVE BOX
[facility] DIVISION of INFECTIOUS DISEASE  Elton Maier MD PhD, Josiane Nuñez MD, Larisa North MD, Arcelia Pal MD  and providing coverage with Vidya Mederos MD and Willard Monique MD  Providing Infectious Disease Consultations at [facility], [facility], [facility]'s    Office# [phone] to schedule follow up appointments  Answering Service for urgent calls or New Consults [phone]  Cell# to text for urgent issues Elton Maier [phone]     HPI:  60 yo [place]ames[place] male with hx of HTN presents to the ER with right elbow pain,  and redness. Symptoms started on Thursday.  Patient reports redness around the arm plus a formation of new abscess.  denies trauma to the area nor infectious exposure.  Was seen at Urgent care , abscess opened and packing placed      PAST MEDICAL & SURGICAL HISTORY:  Hypertension    No significant past surgical history        Antimicrobials  vancomycin  IVPB 1000 milliGRAM(s) IV Intermittent every 12 hours      Immunological      Other  enoxaparin Injectable 40 milliGRAM(s) SubCutaneous every 24 hours      Allergies    No Known Allergies    Intolerances        SOCIAL HISTORY:  Social History:  denies smoking alcohol recreational drugs (2021 15:50)      FAMILY HISTORY:  No pertinent family history in first degree relatives        ROS:    EYES:  Negative  blurry vision or double vision  GASTROINTESTINAL:  Negative for nausea, vomiting, diarrhea  -otherwise negative except for subjective    Vital Signs Last 24 Hrs  T(C): 37 (2021 05:29), Max: 37 (2021 05:29)  T(F): 98.6 (2021 05:29), Max: 98.6 (2021 05:29)  HR: 76 (2021 05:29) (70 - 76)  BP: 144/81 (2021 05:29) (114/78 - 144/81)  BP(mean): --  RR: 18 (2021 05:29) (14 - 18)  SpO2: 96% (2021 05:29) (96% - 99%)    PE:  WDWN in no distress  HEENT:  NC, PERRL, sclerae anicteric, conjunctivae clear, EOMI.  Sinuses nontender, no nasal exudate.  No buccal or pharyngeal lesions, erythema or exudate  Neck:  Supple, no adenopathy  Lungs:  No accessory muscle use, bilaterally clear to auscultation  Cor:  distant  Abd:  Symmetric, normoactive BS.  Soft, nontender, no masses, guarding or rebound.  Liver and spleen not enlarged  Extrem:  right elbow with decreased erythema from inked margins and packed abscess  Neuro: grossly intact  Musc: moving all limbs freely, no focal deficits        LABS:                        14.5   12.28 )-----------( 240      ( 2021 11:05 )             43.3       WBC Count: 12.28 K/uL (21 @ 11:05)          137  |  103  |  14  ----------------------------<  121<H>  3.9   |  27  |  0.98    Ca    8.8      2021 11:05    TPro  7.8  /  Alb  3.5  /  TBili  1.1  /  DBili  x   /  AST  19  /  ALT  30  /  AlkPhos  86        Creatinine, Serum: 0.98 mg/dL (21 @ 11:05)      Urinalysis Basic - ( 2021 12:07 )    Color: Yellow / Appearance: Clear / S.020 / pH: x  Gluc: x / Ketone: Negative  / Bili: Negative / Urobili: 1 mg/dL   Blood: x / Protein: 30 mg/dL / Nitrite: Negative   Leuk Esterase: Trace / RBC: x / WBC 0-2   Sq Epi: x / Non Sq Epi: x / Bacteria: Moderate              MICROBIOLOGY:      RADIOLOGY & ADDITIONAL STUDIES:  < from: Xray Elbow AP + Lateral + Oblique, Right (21 @ 11:30) >    EXAM:  ELBOW RIGHT (3 VIEWS)                                  PROCEDURE DATE:  2021          INTERPRETATION:  Cellulitis right elbow.    3 views right elbow.    No fracture dislocation focal bone lysis or unusual periosteal reaction. Joint spaces preserved. Large olecranon osteophyte. Regional soft tissue edema is nonspecific but consistent with history of cellulitis. No soft tissue gas. No opaque foreign body.    IMPRESSION: No acute or destructive osseous pathology. Soft tissue swelling.    < end of copied text >      --
Date/Time Patient Seen:  		  Referring MD:   Data Reviewed	       Patient is a 61y old  Male who presents with a chief complaint of     Subjective/HPI    in bed  seen and examined  vs and meds reviewed  er provider note reviewed    HISTORY OF PRESENT ILLNESS:    International Travel:  International Travel within 21 days? No.(1)     Domestic Travel:  Any travel outside of [facility] within the last 14 days? No.(1)     Child Abuse Assessment (patients less than 13 yrs):  Chief Complaint: abscess.    · Chief Complaint: The patient is a 61y Male complaining of abscess.  · HPI Objective Statement: 62 yo male with hx of HTN co abscess and infection rt elbow for several days. States through son-in-law as , he noted a painful red swelling with purulence rt elbow 3 days ago. Today abscess popped and swelling went down. Was seen at  today and recommended admission for IV antibiotics. Pt denies fever, pain, cough, or other symptom. Has PCP Tami in [place].   Had COVID vaccine last month.  · Wound Type: ABSCESS  · Negative Findings: no bleeding, no fever, no pain, no vomiting  · Location: elbow  · Laterality: right  · Area: anterior  · Timing: gradual onset  · Duration: day(s)  · Quality: discharge  · Discharge Description: yellow  · Severity: PAIN SCALE 0 OF 10.  · Context: known (describe)  · Aggravated Factors: none  · Relieving Factors: none      non smoker  non drinker    family hx - ashd -     ros - pain right elbow       PAST MEDICAL & SURGICAL HISTORY:  Hypertension          Medication list         MEDICATIONS  (STANDING):    MEDICATIONS  (PRN):         Vitals log        ICU Vital Signs Last 24 Hrs  T(C): 36.7 (18 Apr 2021 12:24), Max: 36.7 (18 Apr 2021 10:26)  T(F): 98 (18 Apr 2021 12:24), Max: 98.1 (18 Apr 2021 10:26)  HR: 75 (18 Apr 2021 12:24) (75 - 76)  BP: 121/75 (18 Apr 2021 12:24) (114/78 - 121/75)  BP(mean): --  ABP: --  ABP(mean): --  RR: 14 (18 Apr 2021 12:24) (14 - 14)  SpO2: 99% (18 Apr 2021 12:24) (98% - 99%)           Input and Output:  I&O's Detail      Lab Data                        14.5   12.28 )-----------( 240      ( 18 Apr 2021 11:05 )             43.3     04-18    137  |  103  |  14  ----------------------------<  121<H>  3.9   |  27  |  0.98    Ca    8.8      18 Apr 2021 11:05    TPro  7.8  /  Alb  3.5  /  TBili  1.1  /  DBili  x   /  AST  19  /  ALT  30  /  AlkPhos  86  04-18            Review of Systems	  right elbow pain    Objective     Physical Examination    right elbow dressed  head nc   head at  heart s1s2  lung dec BS  abd soft  cn grossly int      Pertinent Lab findings & Imaging      Fabian:  NO   Adequate UO     I&O's Detail           Discussed with:     Cultures:	        Radiology    cxr  clear

## 2021-04-19 NOTE — PROGRESS NOTE ADULT - ATTENDING COMMENTS
Patient seen and examined at bedside.  used. Patient reports feeling better. arm pain is 2./10. better mobility  ID notes noted  on IV Vanc    at this time will need to follow blood cx and wound cx. will tailor abx accordingly. hope to dc in 24 hours

## 2021-04-19 NOTE — CONSULT NOTE ADULT - ASSESSMENT
[facility]  Chart Reviewed-Full Consult to follow for any immediate concerns please fell free to contact us directly at  [phone] and have us paged or text my cell # [phone]  Elton Maier MD PhD  
62 yo [place] male with hx of HTN presents to the ER with right elbow pain,  and redness. Symptoms started on Thursday.  Patient reports redness around the arm plus a formation of new abscess.  denies trauma to the area nor infectious exposure.  Was seen at Urgent care , abscess opened and packing placed    Right Elbow Cellulitis w Abscess  highest suspicion is for staph aureus abscess and now drained, no obv joint involvement, fine to continue Vanco with goal trough 10-15 with dosing per pharmacy protocol but based on micro data will be able to de-escalate to PO abx     Thank you for consulting us and involving us in the management of this most interesting and challenging case.  We will follow along in the care of this patient. Please call us at [phone] or text me directly on my cell# at [phone] with any concerns.  
62 yo male with hx of HTN co abscess and infection rt elbow for several days. States through son-in-law as , he noted a painful red swelling with purulence rt elbow 3 days ago. Today abscess popped and swelling went down.  STSI - eval in progress - wound care - ABX - regimen - to cover Staph and Strep -   pain rx regimen  labs and imaging reviewed

## 2021-04-19 NOTE — PHARMACOTHERAPY INTERVENTION NOTE - COMMENTS
Patient has order for Vancomycin 1gm IVPB q12h. Patient has received 3 doses thus far. Spoke to attending MD and made aware that patient does not have a trough level ordered. MD gave telephone order to enter Vanco trough pre-4th dose.

## 2021-04-19 NOTE — PROGRESS NOTE ADULT - ASSESSMENT
60 yo male with hx of HTN co abscess and infection rt elbow for several days. States through son-in-law as , he noted a painful red swelling with purulence rt elbow 3 days ago. Today abscess popped and swelling went down.  STSI - eval in progress - wound care - ABX - regimen - to cover Staph and Strep -   pain rx regimen  labs and imaging reviewed  ID eval reviewed

## 2021-04-20 ENCOUNTER — TRANSCRIPTION ENCOUNTER (OUTPATIENT)
Age: 61
End: 2021-04-20

## 2021-04-20 VITALS
HEART RATE: 64 BPM | SYSTOLIC BLOOD PRESSURE: 128 MMHG | OXYGEN SATURATION: 97 % | RESPIRATION RATE: 16 BRPM | TEMPERATURE: 98 F | DIASTOLIC BLOOD PRESSURE: 70 MMHG

## 2021-04-20 LAB
ANION GAP SERPL CALC-SCNC: 7 MMOL/L — SIGNIFICANT CHANGE UP (ref 5–17)
BASOPHILS # BLD AUTO: 0.04 K/UL — SIGNIFICANT CHANGE UP (ref 0–0.2)
BASOPHILS NFR BLD AUTO: 0.5 % — SIGNIFICANT CHANGE UP (ref 0–2)
BUN SERPL-MCNC: 11 MG/DL — SIGNIFICANT CHANGE UP (ref 7–23)
CALCIUM SERPL-MCNC: 8.7 MG/DL — SIGNIFICANT CHANGE UP (ref 8.4–10.5)
CHLORIDE SERPL-SCNC: 105 MMOL/L — SIGNIFICANT CHANGE UP (ref 96–108)
CO2 SERPL-SCNC: 27 MMOL/L — SIGNIFICANT CHANGE UP (ref 22–31)
CREAT SERPL-MCNC: 0.83 MG/DL — SIGNIFICANT CHANGE UP (ref 0.5–1.3)
EOSINOPHIL # BLD AUTO: 0.16 K/UL — SIGNIFICANT CHANGE UP (ref 0–0.5)
EOSINOPHIL NFR BLD AUTO: 1.8 % — SIGNIFICANT CHANGE UP (ref 0–6)
GLUCOSE SERPL-MCNC: 100 MG/DL — HIGH (ref 70–99)
HCT VFR BLD CALC: 41.4 % — SIGNIFICANT CHANGE UP (ref 39–50)
HGB BLD-MCNC: 13.6 G/DL — SIGNIFICANT CHANGE UP (ref 13–17)
IMM GRANULOCYTES NFR BLD AUTO: 0.3 % — SIGNIFICANT CHANGE UP (ref 0–1.5)
LYMPHOCYTES # BLD AUTO: 2.09 K/UL — SIGNIFICANT CHANGE UP (ref 1–3.3)
LYMPHOCYTES # BLD AUTO: 23.9 % — SIGNIFICANT CHANGE UP (ref 13–44)
MCHC RBC-ENTMCNC: 28.8 PG — SIGNIFICANT CHANGE UP (ref 27–34)
MCHC RBC-ENTMCNC: 32.9 GM/DL — SIGNIFICANT CHANGE UP (ref 32–36)
MCV RBC AUTO: 87.5 FL — SIGNIFICANT CHANGE UP (ref 80–100)
MONOCYTES # BLD AUTO: 0.68 K/UL — SIGNIFICANT CHANGE UP (ref 0–0.9)
MONOCYTES NFR BLD AUTO: 7.8 % — SIGNIFICANT CHANGE UP (ref 2–14)
NEUTROPHILS # BLD AUTO: 5.76 K/UL — SIGNIFICANT CHANGE UP (ref 1.8–7.4)
NEUTROPHILS NFR BLD AUTO: 65.7 % — SIGNIFICANT CHANGE UP (ref 43–77)
NRBC # BLD: 0 /100 WBCS — SIGNIFICANT CHANGE UP (ref 0–0)
PLATELET # BLD AUTO: 300 K/UL — SIGNIFICANT CHANGE UP (ref 150–400)
POTASSIUM SERPL-MCNC: 3.8 MMOL/L — SIGNIFICANT CHANGE UP (ref 3.5–5.3)
POTASSIUM SERPL-SCNC: 3.8 MMOL/L — SIGNIFICANT CHANGE UP (ref 3.5–5.3)
RBC # BLD: 4.73 M/UL — SIGNIFICANT CHANGE UP (ref 4.2–5.8)
RBC # FLD: 12 % — SIGNIFICANT CHANGE UP (ref 10.3–14.5)
SODIUM SERPL-SCNC: 139 MMOL/L — SIGNIFICANT CHANGE UP (ref 135–145)
WBC # BLD: 8.76 K/UL — SIGNIFICANT CHANGE UP (ref 3.8–10.5)
WBC # FLD AUTO: 8.76 K/UL — SIGNIFICANT CHANGE UP (ref 3.8–10.5)

## 2021-04-20 PROCEDURE — 85025 COMPLETE CBC W/AUTO DIFF WBC: CPT

## 2021-04-20 PROCEDURE — 87086 URINE CULTURE/COLONY COUNT: CPT

## 2021-04-20 PROCEDURE — 86140 C-REACTIVE PROTEIN: CPT

## 2021-04-20 PROCEDURE — 87641 MR-STAPH DNA AMP PROBE: CPT

## 2021-04-20 PROCEDURE — 85610 PROTHROMBIN TIME: CPT

## 2021-04-20 PROCEDURE — 73080 X-RAY EXAM OF ELBOW: CPT

## 2021-04-20 PROCEDURE — 83605 ASSAY OF LACTIC ACID: CPT

## 2021-04-20 PROCEDURE — 87040 BLOOD CULTURE FOR BACTERIA: CPT

## 2021-04-20 PROCEDURE — 87640 STAPH A DNA AMP PROBE: CPT

## 2021-04-20 PROCEDURE — 87077 CULTURE AEROBIC IDENTIFY: CPT

## 2021-04-20 PROCEDURE — 81001 URINALYSIS AUTO W/SCOPE: CPT

## 2021-04-20 PROCEDURE — 87186 SC STD MICRODIL/AGAR DIL: CPT

## 2021-04-20 PROCEDURE — 85730 THROMBOPLASTIN TIME PARTIAL: CPT

## 2021-04-20 PROCEDURE — 87070 CULTURE OTHR SPECIMN AEROBIC: CPT

## 2021-04-20 PROCEDURE — 71045 X-RAY EXAM CHEST 1 VIEW: CPT

## 2021-04-20 PROCEDURE — 80053 COMPREHEN METABOLIC PANEL: CPT

## 2021-04-20 PROCEDURE — 96365 THER/PROPH/DIAG IV INF INIT: CPT

## 2021-04-20 PROCEDURE — 99239 HOSP IP/OBS DSCHRG MGMT >30: CPT

## 2021-04-20 PROCEDURE — 99285 EMERGENCY DEPT VISIT HI MDM: CPT | Mod: 25

## 2021-04-20 PROCEDURE — 93005 ELECTROCARDIOGRAM TRACING: CPT

## 2021-04-20 PROCEDURE — 87205 SMEAR GRAM STAIN: CPT

## 2021-04-20 PROCEDURE — 36415 COLL VENOUS BLD VENIPUNCTURE: CPT

## 2021-04-20 PROCEDURE — 80202 ASSAY OF VANCOMYCIN: CPT

## 2021-04-20 PROCEDURE — 84443 ASSAY THYROID STIM HORMONE: CPT

## 2021-04-20 PROCEDURE — 80048 BASIC METABOLIC PNL TOTAL CA: CPT

## 2021-04-20 PROCEDURE — 87635 SARS-COV-2 COVID-19 AMP PRB: CPT

## 2021-04-20 PROCEDURE — 86769 SARS-COV-2 COVID-19 ANTIBODY: CPT

## 2021-04-20 PROCEDURE — 86803 HEPATITIS C AB TEST: CPT

## 2021-04-20 RX ADMIN — Medication 1 TABLET(S): at 15:33

## 2021-04-20 NOTE — DISCHARGE NOTE NURSING/CASE MANAGEMENT/SOCIAL WORK - PATIENT PORTAL LINK FT
You can access the FollowMyHealth Patient Portal offered by [facility] by registering at the following website: [email or web address]. By joining [facility]’s FollowMyHealth portal, you will also be able to view your health information using other applications (apps) compatible with our system.

## 2021-04-20 NOTE — DISCHARGE NOTE PROVIDER - NSDCCPCAREPLAN_GEN_ALL_CORE_FT
PRINCIPAL DISCHARGE DIAGNOSIS  Diagnosis: Cellulitis and abscess  Assessment and Plan of Treatment: you have been diagnosed with an abscess on your arm.   Please take the antibiotics prescribed , twice a day until april 27thB?n dã du?c ch?n doán b? áp xe trên jag neela c?a mình.  Vui lòng u?ng thu?c samueláng sinh du?c kê don, lizaebth l?n m?t ngày cho d?n ngày 27 tháng 4       PRINCIPAL DISCHARGE DIAGNOSIS  Diagnosis: Cellulitis and abscess  Assessment and Plan of Treatment: you have been diagnosed with an abscess on your arm.   Please take the antibiotics prescribed , twice a day until april 27  you are allowed to shower  please keep wound clean  and dry  you can apply gauze on the area and curlex to support   please take tylenol or motrin for pain  if needed        SECONDARY DISCHARGE DIAGNOSES  Diagnosis: Hypertension  Assessment and Plan of Treatment: Please continue your home medications

## 2021-04-20 NOTE — PROGRESS NOTE ADULT - ASSESSMENT
62 yo male with hx of HTN co abscess and infection rt elbow for several days. States through son-in-law as , he noted a painful red swelling with purulence rt elbow 3 days ago. Today abscess popped and swelling went down.  STSI - eval in progress - wound care - ABX - regimen - to cover Staph and Strep -   pain rx regimen  labs and imaging reviewed  ID eval reviewed

## 2021-04-20 NOTE — PROGRESS NOTE ADULT - SUBJECTIVE AND OBJECTIVE BOX
[facility] DIVISION of INFECTIOUS DISEASE  Elton Maier MD PhD, Josiane Nuñez MD, Larisa North MD, Arcelia Pal MD  and providing coverage with Vidya Mederos MD and Willard Monique MD  Providing Infectious Disease Consultations at [facility], [facility], [facility]'s    Office# [phone] to schedule follow up appointments  Answering Service for urgent calls or New Consults [phone]  Cell# to text for urgent issues Elton Maier [phone]     infectious diseases progress note:    ISAC SCHULTZ is a 61y y. o. Male patient    No concerning overnight events    Allergies    No Known Allergies    Intolerances        ANTIBIOTICS/RELEVANT:  antimicrobials  trimethoprim  160 mG/sulfamethoxazole 800 mG 1 Tablet(s) Oral two times a day    immunologic:    OTHER:  enoxaparin Injectable 40 milliGRAM(s) SubCutaneous every 24 hours      Objective:  Vital Signs Last 24 Hrs  T(C): 36.8 (2021 05:12), Max: 36.8 (2021 05:12)  T(F): 98.3 (2021 05:12), Max: 98.3 (2021 05:12)  HR: 67 (2021 05:12) (67 - 96)  BP: 144/74 (2021 05:12) (124/70 - 155/86)  BP(mean): --  RR: 18 (2021 05:12) (18 - 18)  SpO2: 97% (2021 05:12) (97% - 98%)    T(C): 36.8 (21 @ 05:12), Max: 37 (21 @ 05:29)  T(C): 36.8 (- @ 05:12), Max: 37 (21 @ 05:29)  T(C): 36.8 (- @ 05:12), Max: 37 (-21 @ 05:29)    PHYSICAL EXAM:  HEENT: NC atraumatic  Neck: supple  Respiratory: no accessory muscle use, breathing comfortably  Cardiovascular: distant  Gastrointestinal: normal appearing, nondistended  Extremities: no clubbing, no cyanosis, right elbow dressed with decreased erythema        LABS:                          13.6   8.76  )-----------( 300      ( 2021 06:53 )             41.4       8.76  @ 06:53  12.28  @ 11:05          139  |  105  |  11  ----------------------------<  100<H>  3.8   |  27  |  0.83    Ca    8.7      2021 06:53    TPro  7.8  /  Alb  3.5  /  TBili  1.1  /  DBili  x   /  AST  19  /  ALT  30  /  AlkPhos  86        Creatinine, Serum: 0.83 mg/dL (21 @ 06:53)  Creatinine, Serum: 0.98 mg/dL (21 @ 11:05)      PT/INR - ( 2021 11:05 )   PT: 13.2 sec;   INR: 1.10 ratio         PTT - ( 2021 11:05 )  PTT:31.5 sec  Urinalysis Basic - ( 2021 12:07 )    Color: Yellow / Appearance: Clear / S.020 / pH: x  Gluc: x / Ketone: Negative  / Bili: Negative / Urobili: 1 mg/dL   Blood: x / Protein: 30 mg/dL / Nitrite: Negative   Leuk Esterase: Trace / RBC: x / WBC 0-2   Sq Epi: x / Non Sq Epi: x / Bacteria: Moderate            INFLAMMATORY MARKERS  Auto Neutrophil #: 5.76 K/uL (21 @ 06:53)  Auto Lymphocyte #: 2.09 K/uL (21 @ 06:53)  Auto Neutrophil #: 8.75 K/uL (21 @ 11:05)  Auto Lymphocyte #: 2.38 K/uL (21 @ 11:05)    Lactate, Blood: 0.8 mmol/L (21 @ 11:05)    Auto Eosinophil #: 0.16 K/uL (21 @ 06:53)  Auto Eosinophil #: 0.12 K/uL (21 @ 11:05)        Activated Partial Thromboplastin Time: 31.5 sec (21 @ 11:05)  INR: 1.10 ratio (21 @ 11:05)          MICROBIOLOGY:  Culture Results:   Few Staphylococcus aureus  Moderate Gram Negative Rods ( @ 13:23)    MRSA/MSSA PCR (21 @ 12:49)    MRSA PCR Result.: NotDetec:    Staph Aureus PCR Result: Detected      RADIOLOGY & ADDITIONAL STUDIES:  
Date/Time Patient Seen:  		  Referring MD:   Data Reviewed	       Patient is a 61y old  Male who presents with a chief complaint of Right elbow cellulitis (18 Apr 2021 17:36)      Subjective/HPI     PAST MEDICAL & SURGICAL HISTORY:  Hypertension    No significant past surgical history          Medication list         MEDICATIONS  (STANDING):  enoxaparin Injectable 40 milliGRAM(s) SubCutaneous every 24 hours  vancomycin  IVPB 1000 milliGRAM(s) IV Intermittent every 12 hours    MEDICATIONS  (PRN):         Vitals log        ICU Vital Signs Last 24 Hrs  T(C): 37 (19 Apr 2021 05:29), Max: 37 (19 Apr 2021 05:29)  T(F): 98.6 (19 Apr 2021 05:29), Max: 98.6 (19 Apr 2021 05:29)  HR: 76 (19 Apr 2021 05:29) (70 - 76)  BP: 144/81 (19 Apr 2021 05:29) (114/78 - 144/81)  BP(mean): --  ABP: --  ABP(mean): --  RR: 18 (19 Apr 2021 05:29) (14 - 18)  SpO2: 96% (19 Apr 2021 05:29) (96% - 99%)           Input and Output:  I&O's Detail      Lab Data                        14.5   12.28 )-----------( 240      ( 18 Apr 2021 11:05 )             43.3     04-18    137  |  103  |  14  ----------------------------<  121<H>  3.9   |  27  |  0.98    Ca    8.8      18 Apr 2021 11:05    TPro  7.8  /  Alb  3.5  /  TBili  1.1  /  DBili  x   /  AST  19  /  ALT  30  /  AlkPhos  86  04-18            Review of Systems	      Objective     Physical Examination    heart s1s2  lung dec BS  abd soft  head nc  on RA  right elbow dressed      Pertinent Lab findings & Imaging      Fabian:  NO   Adequate UO     I&O's Detail           Discussed with:     Cultures:	        Radiology                            
Patient is a 61y old  Male who presents with a chief complaint of Right elbow cellulitis (2021 09:48)      INTERVAL HPI/OVERNIGHT EVENTS: Patient seen and examined at bedside. No overnight events.  ID # [id number]. Notes elbow feels "much better"    MEDICATIONS  (STANDING):  enoxaparin Injectable 40 milliGRAM(s) SubCutaneous every 24 hours  vancomycin  IVPB 1000 milliGRAM(s) IV Intermittent every 12 hours    MEDICATIONS  (PRN):      Allergies    No Known Allergies    Intolerances        REVIEW OF SYSTEMS:  CONSTITUTIONAL: No fever or chills  HEENT:  No headache, no sore throat  RESPIRATORY: No cough, wheezing, or shortness of breath  CARDIOVASCULAR: No chest pain, palpitations, or leg swelling  GASTROINTESTINAL: No abd pain, nausea, vomiting, or diarrhea  GENITOURINARY: No dysuria, frequency, or hematuria  NEUROLOGICAL: no focal weakness or dizziness  MUSCULOSKELETAL: no myalgias     Vital Signs Last 24 Hrs  T(C): 36.6 (2021 10:55), Max: 37 (2021 05:29)  T(F): 97.9 (2021 10:55), Max: 98.6 (2021 05:29)  HR: 96 (2021 10:55) (70 - 96)  BP: 135/80 (2021 10:55) (135/78 - 144/81)  BP(mean): --  RR: 18 (2021 10:55) (17 - 18)  SpO2: 98% (2021 10:55) (96% - 98%)    PHYSICAL EXAM:  GENERAL: NAD  HEENT:  EOMI, moist mucous membranes  CHEST/LUNG:  CTA b/l, no rales, wheezes, or rhonchi  HEART:  RRR, S1, S2  ABDOMEN:  BS+, soft, nontender, nondistended  EXTREMITIES: right elbow with decreased erythema from inked margins and packed abscess. Dressing c/d/i. NO calf tenderness  NERVOUS SYSTEM: AA&Ox3    LABS:    CBC Full  -  ( 2021 11:05 )  WBC Count : 12.28 K/uL  Hemoglobin : 14.5 g/dL  Hematocrit : 43.3 %  Platelet Count - Automated : 240 K/uL  Mean Cell Volume : 87.8 fl  Mean Cell Hemoglobin : 29.4 pg  Mean Cell Hemoglobin Concentration : 33.5 gm/dL  Auto Neutrophil # : 8.75 K/uL  Auto Lymphocyte # : 2.38 K/uL  Auto Monocyte # : 0.92 K/uL  Auto Eosinophil # : 0.12 K/uL  Auto Basophil # : 0.06 K/uL  Auto Neutrophil % : 71.2 %  Auto Lymphocyte % : 19.4 %  Auto Monocyte % : 7.5 %  Auto Eosinophil % : 1.0 %  Auto Basophil % : 0.5 %      Ca    8.8        2021 11:05      PT/INR - ( 2021 11:05 )   PT: 13.2 sec;   INR: 1.10 ratio         PTT - ( 2021 11:05 )  PTT:31.5 sec  Urinalysis Basic - ( 2021 12:07 )    Color: Yellow / Appearance: Clear / S.020 / pH: x  Gluc: x / Ketone: Negative  / Bili: Negative / Urobili: 1 mg/dL   Blood: x / Protein: 30 mg/dL / Nitrite: Negative   Leuk Esterase: Trace / RBC: x / WBC 0-2   Sq Epi: x / Non Sq Epi: x / Bacteria: Moderate      CAPILLARY BLOOD GLUCOSE              RADIOLOGY & ADDITIONAL TESTS: < from: Xray Elbow AP + Lateral + Oblique, Right (21 @ 11:30) >  EXAM:  ELBOW RIGHT (3 VIEWS)                                  PROCEDURE DATE:  2021          INTERPRETATION:  Cellulitis right elbow.    3 views right elbow.    No fracture dislocation focal bone lysis or unusual periosteal reaction. Joint spaces preserved. Large olecranon osteophyte. Regional soft tissue edema is nonspecific but consistent with history of cellulitis. No soft tissue gas. No opaque foreign body.    IMPRESSION: No acute or destructive osseous pathology. Soft tissue swelling.    Consider MR for additional evaluation              BRYAN GRIFFIN MD; Attending Radiologist  This document has been electronically signed. 2021  3:08PM    < end of copied text >      Consultant(s) Notes Reviewed:  [x] YES  [ ] NO    Care Discussed with [x] Consultants  [x] Patient  [ ] Family  [ ]      [ x] Other; RN  DVT ppx  
Date/Time Patient Seen:  		  Referring MD:   Data Reviewed	       Patient is a 61y old  Male who presents with a chief complaint of Right elbow cellulitis (19 Apr 2021 13:40)      Subjective/HPI     PAST MEDICAL & SURGICAL HISTORY:  Hypertension    No significant past surgical history          Medication list         MEDICATIONS  (STANDING):  enoxaparin Injectable 40 milliGRAM(s) SubCutaneous every 24 hours  vancomycin  IVPB 1000 milliGRAM(s) IV Intermittent every 12 hours    MEDICATIONS  (PRN):         Vitals log        ICU Vital Signs Last 24 Hrs  T(C): 36.8 (20 Apr 2021 05:12), Max: 36.8 (20 Apr 2021 05:12)  T(F): 98.3 (20 Apr 2021 05:12), Max: 98.3 (20 Apr 2021 05:12)  HR: 67 (20 Apr 2021 05:12) (67 - 96)  BP: 144/74 (20 Apr 2021 05:12) (124/70 - 155/86)  BP(mean): --  ABP: --  ABP(mean): --  RR: 18 (20 Apr 2021 05:12) (18 - 18)  SpO2: 97% (20 Apr 2021 05:12) (97% - 98%)           Input and Output:  I&O's Detail    19 Apr 2021 07:01  -  20 Apr 2021 06:47  --------------------------------------------------------  IN:    IV PiggyBack: 250 mL    Oral Fluid: 120 mL  Total IN: 370 mL    OUT:  Total OUT: 0 mL    Total NET: 370 mL          Lab Data                        14.5   12.28 )-----------( 240      ( 18 Apr 2021 11:05 )             43.3     04-18    137  |  103  |  14  ----------------------------<  121<H>  3.9   |  27  |  0.98    Ca    8.8      18 Apr 2021 11:05    TPro  7.8  /  Alb  3.5  /  TBili  1.1  /  DBili  x   /  AST  19  /  ALT  30  /  AlkPhos  86  04-18            Review of Systems	      Objective     Physical Examination    heart s1s2  lung dec BS  abd soft  head nc  on RA  cn grossly int  verbal      Pertinent Lab findings & Imaging      Fabian:  NO   Adequate UO     I&O's Detail    19 Apr 2021 07:01  -  20 Apr 2021 06:47  --------------------------------------------------------  IN:    IV PiggyBack: 250 mL    Oral Fluid: 120 mL  Total IN: 370 mL    OUT:  Total OUT: 0 mL    Total NET: 370 mL               Discussed with:     Cultures:	        Radiology

## 2021-04-20 NOTE — DISCHARGE NOTE PROVIDER - NSDCMRMEDTOKEN_GEN_ALL_CORE_FT
Bactrim  mg-160 mg oral tablet: 1 tab(s) orally 2 times a day  doxazosin 2 mg oral tablet: 1 tab(s) orally once a day

## 2021-04-20 NOTE — DISCHARGE NOTE PROVIDER - HOSPITAL COURSE
62 yo male with hx of HTN admitted to  ED for evaluation of right elbow pain and swelling. Patient found to have a right superficial elbow abscess.  self drained while in ED. Course complicated by superficial cellulitis. Admitted to medicine for further care    Right elbow abscess , self drained c/b peripheral cellulitis  ID consulted, reccs appreciated   Patient started on Vanco empirically   Wound cultures ++ staph  a + rare gram negative  blood and urine culture negative  Surgery consulted over the phone. discussed case, recommended outpatient followup in 1 to two weeks    HTN  resume home meds    dvt ppx: lovenox    At this time patient is medically stable for discharge.     Greater than 30 minutes spent face to face encounter on discharge planning including care coordination planning, disposition and medication reconcillation 60 yo male with hx of HTN admitted to  ED for evaluation of right elbow pain and swelling. Patient found to have a right superficial elbow abscess.  self drained while in ED. Course complicated by superficial cellulitis. Admitted to medicine for further care    Right forearm aabscess , self drained c/b peripheral cellulitis  ID consulted, reccs appreciated   Patient started on Vanco empirically   Wound cultures ++ staph  a + rare gram negative  blood and urine culture negative  Surgery consulted over the phone. discussed case, recommended outpatient followup in 1 to two weeks    HTN  resume home meds    dvt ppx: lovenox    At this time patient is medically stable for discharge.     Greater than 30 minutes spent face to face encounter on discharge planning including care coordination planning, disposition and medication reconcillation

## 2021-04-20 NOTE — PROGRESS NOTE ADULT - ASSESSMENT
62 yo [place] male with hx of HTN presents to the ER with right elbow pain,  and redness. Symptoms started on Thursday.  Patient reports redness around the arm plus a formation of new abscess.  denies trauma to the area nor infectious exposure.  Was seen at Urgent care , abscess opened and packing placed    Right Elbow Cellulitis w Abscess  highest suspicion is for staph aureus abscess and confirmed by micro and now drained, no obv joint involvement,  have changed to oral abx and recommend  Bactrim DS 1-tab PO BID with last day 4/27-extended course based on data suggested decreased recurrence rates    From an ID standpoint no further requirement for inpatient status for the management of ID issues. Fine with discharge from ID standpoint when other medical issues and surgical care no longer require inpatient care and social issues allow for a safe discharge plan. To schedule an outpatient ID follow up appointment please call our office at [phone].    Thank you for consulting us and involving us in the management of this most interesting and challenging case.  Please call us at [phone] or text me directly on my cell#[phone] with any concerns or further questions.

## 2021-04-21 LAB
-  AMIKACIN: SIGNIFICANT CHANGE UP
-  AMOXICILLIN/CLAVULANIC ACID: SIGNIFICANT CHANGE UP
-  AMPICILLIN/SULBACTAM: SIGNIFICANT CHANGE UP
-  AMPICILLIN/SULBACTAM: SIGNIFICANT CHANGE UP
-  AMPICILLIN: SIGNIFICANT CHANGE UP
-  AZTREONAM: SIGNIFICANT CHANGE UP
-  CEFAZOLIN: SIGNIFICANT CHANGE UP
-  CEFAZOLIN: SIGNIFICANT CHANGE UP
-  CEFEPIME: SIGNIFICANT CHANGE UP
-  CEFOXITIN: SIGNIFICANT CHANGE UP
-  CEFTRIAXONE: SIGNIFICANT CHANGE UP
-  CIPROFLOXACIN: SIGNIFICANT CHANGE UP
-  CLINDAMYCIN: SIGNIFICANT CHANGE UP
-  ERTAPENEM: SIGNIFICANT CHANGE UP
-  ERYTHROMYCIN: SIGNIFICANT CHANGE UP
-  GENTAMICIN: SIGNIFICANT CHANGE UP
-  GENTAMICIN: SIGNIFICANT CHANGE UP
-  IMIPENEM: SIGNIFICANT CHANGE UP
-  LEVOFLOXACIN: SIGNIFICANT CHANGE UP
-  MEROPENEM: SIGNIFICANT CHANGE UP
-  OXACILLIN: SIGNIFICANT CHANGE UP
-  PENICILLIN: SIGNIFICANT CHANGE UP
-  PIPERACILLIN/TAZOBACTAM: SIGNIFICANT CHANGE UP
-  RIFAMPIN: SIGNIFICANT CHANGE UP
-  TETRACYCLINE: SIGNIFICANT CHANGE UP
-  TOBRAMYCIN: SIGNIFICANT CHANGE UP
-  TRIMETHOPRIM/SULFAMETHOXAZOLE: SIGNIFICANT CHANGE UP
-  TRIMETHOPRIM/SULFAMETHOXAZOLE: SIGNIFICANT CHANGE UP
-  VANCOMYCIN: SIGNIFICANT CHANGE UP
HCV AB S/CO SERPL IA: 0.12 S/CO — SIGNIFICANT CHANGE UP (ref 0–0.99)
HCV AB SERPL-IMP: SIGNIFICANT CHANGE UP
METHOD TYPE: SIGNIFICANT CHANGE UP
METHOD TYPE: SIGNIFICANT CHANGE UP

## 2021-04-23 LAB
CULTURE RESULTS: SIGNIFICANT CHANGE UP
CULTURE RESULTS: SIGNIFICANT CHANGE UP
SPECIMEN SOURCE: SIGNIFICANT CHANGE UP
SPECIMEN SOURCE: SIGNIFICANT CHANGE UP

## 2021-04-24 LAB
CULTURE RESULTS: SIGNIFICANT CHANGE UP
ORGANISM # SPEC MICROSCOPIC CNT: SIGNIFICANT CHANGE UP
SPECIMEN SOURCE: SIGNIFICANT CHANGE UP

## 2021-04-26 ENCOUNTER — INPATIENT (INPATIENT)
Facility: HOSPITAL | Age: 61
LOS: 4 days | Discharge: HOME | DRG: 581 | End: 2021-05-01
Attending: INTERNAL MEDICINE | Admitting: INTERNAL MEDICINE
Payer: COMMERCIAL

## 2021-04-26 VITALS
DIASTOLIC BLOOD PRESSURE: 68 MMHG | TEMPERATURE: 98 F | OXYGEN SATURATION: 100 % | SYSTOLIC BLOOD PRESSURE: 129 MMHG | HEIGHT: 67 IN | RESPIRATION RATE: 14 BRPM | WEIGHT: 149.91 LBS | HEART RATE: 73 BPM

## 2021-04-26 DIAGNOSIS — L02.419 CUTANEOUS ABSCESS OF LIMB, UNSPECIFIED: ICD-10-CM

## 2021-04-26 LAB
ALBUMIN SERPL ELPH-MCNC: 3.5 G/DL — SIGNIFICANT CHANGE UP (ref 3.3–5)
ALP SERPL-CCNC: 93 U/L — SIGNIFICANT CHANGE UP (ref 30–120)
ALT FLD-CCNC: 60 U/L DA — SIGNIFICANT CHANGE UP (ref 10–60)
ANION GAP SERPL CALC-SCNC: 8 MMOL/L — SIGNIFICANT CHANGE UP (ref 5–17)
AST SERPL-CCNC: 27 U/L — SIGNIFICANT CHANGE UP (ref 10–40)
BASOPHILS # BLD AUTO: 0.06 K/UL — SIGNIFICANT CHANGE UP (ref 0–0.2)
BASOPHILS NFR BLD AUTO: 0.7 % — SIGNIFICANT CHANGE UP (ref 0–2)
BILIRUB SERPL-MCNC: 0.4 MG/DL — SIGNIFICANT CHANGE UP (ref 0.2–1.2)
BUN SERPL-MCNC: 14 MG/DL — SIGNIFICANT CHANGE UP (ref 7–23)
CALCIUM SERPL-MCNC: 9.1 MG/DL — SIGNIFICANT CHANGE UP (ref 8.4–10.5)
CHLORIDE SERPL-SCNC: 102 MMOL/L — SIGNIFICANT CHANGE UP (ref 96–108)
CO2 SERPL-SCNC: 28 MMOL/L — SIGNIFICANT CHANGE UP (ref 22–31)
CREAT SERPL-MCNC: 1.14 MG/DL — SIGNIFICANT CHANGE UP (ref 0.5–1.3)
CRP SERPL-MCNC: <3 MG/L — SIGNIFICANT CHANGE UP
EOSINOPHIL # BLD AUTO: 0.3 K/UL — SIGNIFICANT CHANGE UP (ref 0–0.5)
EOSINOPHIL NFR BLD AUTO: 3.4 % — SIGNIFICANT CHANGE UP (ref 0–6)
GLUCOSE SERPL-MCNC: 122 MG/DL — HIGH (ref 70–99)
HCT VFR BLD CALC: 43.8 % — SIGNIFICANT CHANGE UP (ref 39–50)
HGB BLD-MCNC: 14.7 G/DL — SIGNIFICANT CHANGE UP (ref 13–17)
IMM GRANULOCYTES NFR BLD AUTO: 0.6 % — SIGNIFICANT CHANGE UP (ref 0–1.5)
LYMPHOCYTES # BLD AUTO: 2.33 K/UL — SIGNIFICANT CHANGE UP (ref 1–3.3)
LYMPHOCYTES # BLD AUTO: 26.5 % — SIGNIFICANT CHANGE UP (ref 13–44)
MCHC RBC-ENTMCNC: 28.8 PG — SIGNIFICANT CHANGE UP (ref 27–34)
MCHC RBC-ENTMCNC: 33.6 GM/DL — SIGNIFICANT CHANGE UP (ref 32–36)
MCV RBC AUTO: 85.9 FL — SIGNIFICANT CHANGE UP (ref 80–100)
MONOCYTES # BLD AUTO: 0.61 K/UL — SIGNIFICANT CHANGE UP (ref 0–0.9)
MONOCYTES NFR BLD AUTO: 6.9 % — SIGNIFICANT CHANGE UP (ref 2–14)
NEUTROPHILS # BLD AUTO: 5.43 K/UL — SIGNIFICANT CHANGE UP (ref 1.8–7.4)
NEUTROPHILS NFR BLD AUTO: 61.9 % — SIGNIFICANT CHANGE UP (ref 43–77)
NRBC # BLD: 0 /100 WBCS — SIGNIFICANT CHANGE UP (ref 0–0)
PLATELET # BLD AUTO: 348 K/UL — SIGNIFICANT CHANGE UP (ref 150–400)
POTASSIUM SERPL-MCNC: 4.1 MMOL/L — SIGNIFICANT CHANGE UP (ref 3.5–5.3)
POTASSIUM SERPL-SCNC: 4.1 MMOL/L — SIGNIFICANT CHANGE UP (ref 3.5–5.3)
PROT SERPL-MCNC: 7.8 G/DL — SIGNIFICANT CHANGE UP (ref 6–8.3)
RBC # BLD: 5.1 M/UL — SIGNIFICANT CHANGE UP (ref 4.2–5.8)
RBC # FLD: 11.9 % — SIGNIFICANT CHANGE UP (ref 10.3–14.5)
SARS-COV-2 RNA SPEC QL NAA+PROBE: SIGNIFICANT CHANGE UP
SODIUM SERPL-SCNC: 138 MMOL/L — SIGNIFICANT CHANGE UP (ref 135–145)
WBC # BLD: 8.78 K/UL — SIGNIFICANT CHANGE UP (ref 3.8–10.5)
WBC # FLD AUTO: 8.78 K/UL — SIGNIFICANT CHANGE UP (ref 3.8–10.5)

## 2021-04-26 PROCEDURE — 99222 1ST HOSP IP/OBS MODERATE 55: CPT

## 2021-04-26 PROCEDURE — 71046 X-RAY EXAM CHEST 2 VIEWS: CPT | Mod: 26

## 2021-04-26 PROCEDURE — 99285 EMERGENCY DEPT VISIT HI MDM: CPT

## 2021-04-26 RX ORDER — ACETAMINOPHEN 500 MG
650 TABLET ORAL ONCE
Refills: 0 | Status: COMPLETED | OUTPATIENT
Start: 2021-04-26 | End: 2021-04-26

## 2021-04-26 RX ORDER — MORPHINE SULFATE 50 MG/1
2 CAPSULE, EXTENDED RELEASE ORAL ONCE
Refills: 0 | Status: DISCONTINUED | OUTPATIENT
Start: 2021-04-26 | End: 2021-04-26

## 2021-04-26 RX ORDER — KETOROLAC TROMETHAMINE 30 MG/ML
15 SYRINGE (ML) INJECTION ONCE
Refills: 0 | Status: DISCONTINUED | OUTPATIENT
Start: 2021-04-26 | End: 2021-04-26

## 2021-04-26 RX ORDER — CEFAZOLIN SODIUM 1 G
1000 VIAL (EA) INJECTION ONCE
Refills: 0 | Status: COMPLETED | OUTPATIENT
Start: 2021-04-26 | End: 2021-04-26

## 2021-04-26 RX ORDER — CEFAZOLIN SODIUM 1 G
1000 VIAL (EA) INJECTION EVERY 8 HOURS
Refills: 0 | Status: DISCONTINUED | OUTPATIENT
Start: 2021-04-26 | End: 2021-04-27

## 2021-04-26 RX ORDER — DOXAZOSIN MESYLATE 4 MG
2 TABLET ORAL DAILY
Refills: 0 | Status: DISCONTINUED | OUTPATIENT
Start: 2021-04-26 | End: 2021-05-01

## 2021-04-26 RX ORDER — CEFAZOLIN SODIUM 1 G
VIAL (EA) INJECTION
Refills: 0 | Status: DISCONTINUED | OUTPATIENT
Start: 2021-04-26 | End: 2021-04-27

## 2021-04-26 RX ADMIN — MORPHINE SULFATE 2 MILLIGRAM(S): 50 CAPSULE, EXTENDED RELEASE ORAL at 10:00

## 2021-04-26 RX ADMIN — Medication 100 MILLIGRAM(S): at 21:22

## 2021-04-26 RX ADMIN — Medication 15 MILLIGRAM(S): at 09:49

## 2021-04-26 RX ADMIN — Medication 2 MILLIGRAM(S): at 13:53

## 2021-04-26 RX ADMIN — Medication 100 MILLIGRAM(S): at 13:46

## 2021-04-26 RX ADMIN — Medication 650 MILLIGRAM(S): at 09:02

## 2021-04-26 RX ADMIN — Medication 100 MILLIGRAM(S): at 09:02

## 2021-04-26 RX ADMIN — Medication 1000 MILLIGRAM(S): at 09:49

## 2021-04-26 RX ADMIN — Medication 650 MILLIGRAM(S): at 09:49

## 2021-04-26 RX ADMIN — Medication 15 MILLIGRAM(S): at 09:02

## 2021-04-26 RX ADMIN — MORPHINE SULFATE 2 MILLIGRAM(S): 50 CAPSULE, EXTENDED RELEASE ORAL at 11:20

## 2021-04-26 NOTE — ED ADULT NURSE NOTE - CADM POA URETHRAL CATHETER
GEN: Alert & Oriented x 3, No acute distress. Calm, appropriate.  Head and Neck: Normocephalic, atraumatic.  Eyes: PERRL. No conjunctival injection. No scleral icterus.   RESP: Lungs clear to auscult bilat. no wheezes, rhonchi or rales. No retractions. Equal air entry.  CARDIO: regular rate and rhythm, no murmurs, rubs or gallops. Normal S1, S2. No JVD or hepatojugular reflex noted. Radial pulses 2+ bilaterally.   MS: swelling and deformity noted to left wrist. tenderness with palpation to left wrist. no tenderness with palpation to left elbow. full rom of left elbow and fingers. no tenderness with palpation to right wrist.   SKIN: no rashes/lesions, no petechiae, no ecchymosis.  NEURO: CN II-XII grossly intact. Strength + sensation intact upper extremities.
No

## 2021-04-26 NOTE — ED ADULT NURSE NOTE - HIV OFFER
Department of Anesthesiology  Preprocedure Note       Name:  Tru Stage   Age:  63 y.o.  :  1952                                          MRN:  [id number]         Date:  10/15/2018      Surgeon: Candance Pottier):  Adiel Nieto DO    Procedure: Procedure(s):  UPPER EUS/EGD FNA NF W/ANES. UPPER EUS/EGD FNA NF W/ANES. Medications prior to admission:   Prior to Admission medications    Medication Sig Start Date End Date Taking? Authorizing Provider   celecoxib (CELEBREX) 100 MG capsule Take 200 mg by mouth 2 times daily   Yes Historical Provider, MD   acetaminophen (TYLENOL) 500 MG tablet Take 1,000 mg by mouth every 6 hours as needed for Pain   Yes Historical Provider, MD   Melatonin CR 3 MG TBCR Take by mouth   Yes Historical Provider, MD   albuterol sulfate HFA (VENTOLIN HFA) 108 (90 Base) MCG/ACT inhaler Inhale 2 puffs into the lungs every 6 hours as needed for Wheezing 10/8/18  Yes YAHAIRA Hernandez CNP   pantoprazole (PROTONIX) 20 MG tablet Take 2 tablets by mouth daily 18  Yes Nataliya Pablo PA-C   Bacillus Coagulans-Inulin (PROBIOTIC FORMULA) 1-250 BILLION-MG CAPS Take 1 Dose by mouth daily 9/26/18 10/26/18 Yes Nataliya Pablo PA-C   simvastatin (ZOCOR) 20 MG tablet TAKE 1 TABLET BY MOUTH NIGHTLY 18  Yes YAHAIRA Hernandez CNP   magnesium oxide (MAG-OX) 400 MG tablet 1 tab bid 18  Yes YAHAIRA Cheney CNP   folic acid (FOLVITE) 1 MG tablet TAKE 1 TABLET BY MOUTH DAILY 18  Yes YAHAIRA Cheney CNP   levothyroxine (SYNTHROID) 200 MCG tablet Take 1 tablet by mouth daily 18 Yes YAHAIRA Cheney CNP   blood glucose test strips (ASCENSIA AUTODISC VI;ONE TOUCH ULTRA TEST VI) strip 1 each by In Vitro route daily As needed.  9/10/18  Yes YAHAIRA Cheney CNP   ONE TOUCH LANCETS MISC 1 each by Does not apply route daily 18  Yes YAHAIRA Hernandez CNP   [employer] VITAMIN D3 22429 units CAPS capsule Opt out

## 2021-04-26 NOTE — CONSULT NOTE ADULT - ASSESSMENT
62 yo [place] male with hx of HTN presents to the ER with right elbow pain,  and redness. previously  seen at Urgent care , abscess opened and packing placed then addmitted and discharged on Bactrim returns 4/26 with worsening induration and pain    Right Elbow Cellulitis w Abscess  noted Klebsiella and MSSA and discharged on appropriate abx but recommend escalation to IV and involvement of surgery for further site control.    Thank you for consulting us and involving us in the management of this most interesting and challenging case.  Please call us at [phone] or text me directly on my cell#[phone] with any concerns or further questions.

## 2021-04-26 NOTE — H&P ADULT - TIME BILLING
pt not able to f/u with outpatient provider for management, will need further IV abx management here before being discharged back home.

## 2021-04-26 NOTE — H&P ADULT - NSHPPHYSICALEXAM_GEN_ALL_CORE
Jeet Guaman is requesting a refill of oxyCODONE-acetaminophen (PERCOCET) 5-325 MG per tablet for a 30 day supply and would like sent to local pharmacy.   GENERAL: NAD  HEAD:  Atraumatic, Normocephalic  EYES: EOMI, PERRLA, conjunctiva and sclera clear  ENMT: No tonsillar erythema, exudates, or enlargement; Moist mucous membranes  NECK: Supple, No JVD, Normal thyroid  CHEST/LUNG: Clear to percussion bilaterally; No rales, rhonchi, wheezing, or rubs  HEART: Regular rate and rhythm; No murmurs, rubs, or gallops  ABDOMEN: Soft, Nontender, Nondistended; Bowel sounds present  EXTREMITIES: Right elbow s/p I and D.   NERVOUS SYSTEM:  Alert & Oriented X3, Good concentration; Motor Strength 5/5 B/L upper and lower extremities; DTRs 2+ intact and symmetric  SKIN: No rashes or lesions

## 2021-04-26 NOTE — H&P ADULT - ASSESSMENT
62 yo male pmh htn admitted for right elbow cellulitis/abscess    #Right Elbow Cellulitis/Abscess  s/p I and D  on ancef  consulted Dr. Pena who saw him last time  possibly continue ancef, will decide what ID says    #HTN  on doxasosin    #DVT proph  Ambulate  SCDs   62 yo male pmh htn admitted for right elbow cellulitis/abscess    #Right Elbow Cellulitis/Abscess  s/p I and D  consulted Dr. Pena who saw him last time  continue ancef, sensitive to it per last culture done  f/u cultures    #HTN  on doxasosin    #DVT proph  Ambulate  SCDs

## 2021-04-26 NOTE — ED ADULT NURSE NOTE - OBJECTIVE STATEMENT
60 y/o male received aox4 ambulatory c/o ongoing right elbow swelling/pain. pt was treated and discharged from [facility] for right elbow abscess 5 days ago, was discharged on antibiotics but reports that the pain and swelling have worsened. sanguinous/purulent oozing noted to right elbow wound. warm compress applied as ordered.

## 2021-04-26 NOTE — H&P ADULT - HISTORY OF PRESENT ILLNESS
62 yo male pmh htn is c/o Right elbow swelling and pain since last night.  No fever, chills.  Pt admitted here recently for the same thing, he was discharged last tuesday on bactrim.  Didn't improve so he's back at the ER.     In the ER, cbc was WNL.  He had a I and D done at bedside by ER provider.  Pt was given Ancef.  He didn't follow up with outside doctor so ER provider want him to be admitted.

## 2021-04-26 NOTE — ED PROVIDER NOTE - OBJECTIVE STATEMENT
Pt's son-in-law Kenney will translate.  Right elbow swelling and pain since last night.  No fever, chills.  Discharge decreases.   Pt admitted here last Sunday and dc this past Tuesday and has been on antibiotic.

## 2021-04-26 NOTE — H&P ADULT - NSHPREVIEWOFSYSTEMS_GEN_ALL_CORE
REVIEW OF SYSTEMS:    CONSTITUTIONAL: No fever, weight loss, or fatigue  EYES: No eye pain, visual disturbances, or discharge  ENMT:  No difficulty hearing, tinnitus, vertigo; No sinus or throat pain  NECK: No pain or stiffness  RESPIRATORY: No cough, wheezing, chills or hemoptysis; No shortness of breath  CARDIOVASCULAR: No chest pain, palpitations, dizziness, or leg swelling  GASTROINTESTINAL: No abdominal or epigastric pain. No nausea, vomiting, or hematemesis; No diarrhea or constipation. No melena or hematochezia.  GENITOURINARY: No dysuria, frequency, hematuria, or incontinence  NEUROLOGICAL: No headaches, memory loss, loss of strength, numbness, or tremors  MUSCULOSKELETAL: elbow pain  PSYCHIATRIC: No depression, anxiety, mood swings, or difficulty sleeping

## 2021-04-27 LAB
ALBUMIN SERPL ELPH-MCNC: 3.3 G/DL — SIGNIFICANT CHANGE UP (ref 3.3–5)
ALP SERPL-CCNC: 88 U/L — SIGNIFICANT CHANGE UP (ref 30–120)
ALT FLD-CCNC: 47 U/L DA — SIGNIFICANT CHANGE UP (ref 10–60)
ANION GAP SERPL CALC-SCNC: 7 MMOL/L — SIGNIFICANT CHANGE UP (ref 5–17)
AST SERPL-CCNC: 25 U/L — SIGNIFICANT CHANGE UP (ref 10–40)
BASOPHILS # BLD AUTO: 0.05 K/UL — SIGNIFICANT CHANGE UP (ref 0–0.2)
BASOPHILS NFR BLD AUTO: 0.6 % — SIGNIFICANT CHANGE UP (ref 0–2)
BILIRUB SERPL-MCNC: 0.4 MG/DL — SIGNIFICANT CHANGE UP (ref 0.2–1.2)
BUN SERPL-MCNC: 16 MG/DL — SIGNIFICANT CHANGE UP (ref 7–23)
CALCIUM SERPL-MCNC: 8.9 MG/DL — SIGNIFICANT CHANGE UP (ref 8.4–10.5)
CHLORIDE SERPL-SCNC: 103 MMOL/L — SIGNIFICANT CHANGE UP (ref 96–108)
CO2 SERPL-SCNC: 27 MMOL/L — SIGNIFICANT CHANGE UP (ref 22–31)
COVID-19 SPIKE DOMAIN AB INTERP: POSITIVE
COVID-19 SPIKE DOMAIN ANTIBODY RESULT: 23.7 U/ML — HIGH
CREAT SERPL-MCNC: 1.05 MG/DL — SIGNIFICANT CHANGE UP (ref 0.5–1.3)
EOSINOPHIL # BLD AUTO: 0.23 K/UL — SIGNIFICANT CHANGE UP (ref 0–0.5)
EOSINOPHIL NFR BLD AUTO: 2.8 % — SIGNIFICANT CHANGE UP (ref 0–6)
GLUCOSE SERPL-MCNC: 100 MG/DL — HIGH (ref 70–99)
HCT VFR BLD CALC: 42.4 % — SIGNIFICANT CHANGE UP (ref 39–50)
HGB BLD-MCNC: 14.5 G/DL — SIGNIFICANT CHANGE UP (ref 13–17)
IMM GRANULOCYTES NFR BLD AUTO: 0.2 % — SIGNIFICANT CHANGE UP (ref 0–1.5)
LYMPHOCYTES # BLD AUTO: 2.23 K/UL — SIGNIFICANT CHANGE UP (ref 1–3.3)
LYMPHOCYTES # BLD AUTO: 27.2 % — SIGNIFICANT CHANGE UP (ref 13–44)
MCHC RBC-ENTMCNC: 29.4 PG — SIGNIFICANT CHANGE UP (ref 27–34)
MCHC RBC-ENTMCNC: 34.2 GM/DL — SIGNIFICANT CHANGE UP (ref 32–36)
MCV RBC AUTO: 86 FL — SIGNIFICANT CHANGE UP (ref 80–100)
MONOCYTES # BLD AUTO: 0.58 K/UL — SIGNIFICANT CHANGE UP (ref 0–0.9)
MONOCYTES NFR BLD AUTO: 7.1 % — SIGNIFICANT CHANGE UP (ref 2–14)
NEUTROPHILS # BLD AUTO: 5.1 K/UL — SIGNIFICANT CHANGE UP (ref 1.8–7.4)
NEUTROPHILS NFR BLD AUTO: 62.1 % — SIGNIFICANT CHANGE UP (ref 43–77)
NRBC # BLD: 0 /100 WBCS — SIGNIFICANT CHANGE UP (ref 0–0)
PLATELET # BLD AUTO: 326 K/UL — SIGNIFICANT CHANGE UP (ref 150–400)
POTASSIUM SERPL-MCNC: 4.1 MMOL/L — SIGNIFICANT CHANGE UP (ref 3.5–5.3)
POTASSIUM SERPL-SCNC: 4.1 MMOL/L — SIGNIFICANT CHANGE UP (ref 3.5–5.3)
PROT SERPL-MCNC: 7.2 G/DL — SIGNIFICANT CHANGE UP (ref 6–8.3)
RBC # BLD: 4.93 M/UL — SIGNIFICANT CHANGE UP (ref 4.2–5.8)
RBC # FLD: 11.8 % — SIGNIFICANT CHANGE UP (ref 10.3–14.5)
SARS-COV-2 IGG+IGM SERPL QL IA: 23.7 U/ML — HIGH
SARS-COV-2 IGG+IGM SERPL QL IA: POSITIVE
SODIUM SERPL-SCNC: 137 MMOL/L — SIGNIFICANT CHANGE UP (ref 135–145)
WBC # BLD: 8.21 K/UL — SIGNIFICANT CHANGE UP (ref 3.8–10.5)
WBC # FLD AUTO: 8.21 K/UL — SIGNIFICANT CHANGE UP (ref 3.8–10.5)

## 2021-04-27 PROCEDURE — 99232 SBSQ HOSP IP/OBS MODERATE 35: CPT

## 2021-04-27 RX ORDER — CEFTRIAXONE 500 MG/1
1000 INJECTION, POWDER, FOR SOLUTION INTRAMUSCULAR; INTRAVENOUS EVERY 24 HOURS
Refills: 0 | Status: DISCONTINUED | OUTPATIENT
Start: 2021-04-27 | End: 2021-05-01

## 2021-04-27 RX ADMIN — Medication 100 MILLIGRAM(S): at 06:44

## 2021-04-27 RX ADMIN — Medication 2 MILLIGRAM(S): at 06:43

## 2021-04-27 RX ADMIN — CEFTRIAXONE 100 MILLIGRAM(S): 500 INJECTION, POWDER, FOR SOLUTION INTRAMUSCULAR; INTRAVENOUS at 11:14

## 2021-04-27 NOTE — PROGRESS NOTE ADULT - ASSESSMENT
60 yo [place] male with hx of HTN presents to the ER with right elbow pain,  and redness. previously  seen at Urgent care , abscess opened and packing placed then addmitted and discharged on Bactrim returns 4/26 with worsening induration and pain    Right Elbow Cellulitis w Abscess  noted Klebsiella and MSSA and discharged on appropriate abx repeat I+D required 4/26  agreed with escalation to IV  4/27-changed to ceftriaxone and will follow for timing of change to PO

## 2021-04-27 NOTE — PROGRESS NOTE ADULT - ASSESSMENT
62 yo male pmh htn admitted for right elbow cellulitis/abscess    #Right Elbow Cellulitis/Abscess  s/p I and D  Dr[facility] Pena [facility] ID [facility] recs appreciated  f/u cultures    #HTN  on doxasosin    #DVT proph  Ambulate  SCDs   60 yo male pmh htn admitted for right elbow cellulitis/abscess    #Right Elbow Cellulitis/Abscess  s/p I and D, wound care consulted to assess incision site.   Dr. Pena from ID switched abx to rocephin, recs appreciated  f/u cultures    #HTN  on doxasosin    #DVT proph  Ambulate  SCDs

## 2021-04-27 NOTE — PROGRESS NOTE ADULT - SUBJECTIVE AND OBJECTIVE BOX
[facility] DIVISION of INFECTIOUS DISEASE  Elton Maier MD PhD, Josiane Nuñez MD, Larisa North MD, Arcelia Pal MD  and providing coverage with Vidya Mederos MD and Willard Monique MD  Providing Infectious Disease Consultations at [facility], [facility], [facility]'s    Office# [phone] to schedule follow up appointments  Answering Service for urgent calls or New Consults [phone]  Cell# to text for urgent issues Elton Maier [phone]     infectious diseases progress note:    ISAC SCHULTZ is a 61y y. o. Male patient    No concerning overnight events    Allergies    No Known Allergies    Intolerances        ANTIBIOTICS/RELEVANT:  antimicrobials  cefTRIAXone   IVPB 1000 milliGRAM(s) IV Intermittent every 24 hours    immunologic:    OTHER:  doxazosin 2 milliGRAM(s) Oral daily      Objective:  Vital Signs Last 24 Hrs  T(C): 36.4 (27 Apr 2021 05:51), Max: 36.7 (26 Apr 2021 14:30)  T(F): 97.6 (27 Apr 2021 05:51), Max: 98.1 (27 Apr 2021 05:13)  HR: 66 (27 Apr 2021 05:51) (58 - 66)  BP: 134/76 (27 Apr 2021 05:51) (130/72 - 146/78)  BP(mean): --  RR: 18 (27 Apr 2021 05:51) (16 - 18)  SpO2: 98% (27 Apr 2021 05:51) (97% - 99%)    T(C): 36.4 (04-27-21 @ 05:51), Max: 36.7 (04-26-21 @ 08:08)  T(C): 36.4 (04-27-21 @ 05:51), Max: 36.7 (04-26-21 @ 08:08)  T(C): 36.4 (04-27-21 @ 05:51), Max: 36.7 (04-26-21 @ 08:08)    PHYSICAL EXAM:  HEENT: NC atraumatic  Neck: supple  Respiratory: no accessory muscle use, breathing comfortably  Cardiovascular: distant  Gastrointestinal: normal appearing, nondistended  Extremities: no clubbing, no cyanosis, packed wound rt elbow with some improvement        LABS:                          14.5   8.21  )-----------( 326      ( 27 Apr 2021 08:00 )             42.4       8.21 04-27 @ 08:00  8.78 04-26 @ 09:16      04-27    137  |  103  |  16  ----------------------------<  100<H>  4.1   |  27  |  1.05    Ca    8.9      27 Apr 2021 08:00    TPro  7.2  /  Alb  3.3  /  TBili  0.4  /  DBili  x   /  AST  25  /  ALT  47  /  AlkPhos  88  04-27      Creatinine, Serum: 1.05 mg/dL (04-27-21 @ 08:00)  Creatinine, Serum: 1.14 mg/dL (04-26-21 @ 09:16)                INFLAMMATORY MARKERS  Auto Neutrophil #: 5.10 K/uL (04-27-21 @ 08:00)  Auto Lymphocyte #: 2.23 K/uL (04-27-21 @ 08:00)  Auto Neutrophil #: 5.43 K/uL (04-26-21 @ 09:16)  Auto Lymphocyte #: 2.33 K/uL (04-26-21 @ 09:16)  Auto Neutrophil #: 5.76 K/uL (04-20-21 @ 06:53)  Auto Lymphocyte #: 2.09 K/uL (04-20-21 @ 06:53)  Auto Neutrophil #: 8.75 K/uL (04-18-21 @ 11:05)  Auto Lymphocyte #: 2.38 K/uL (04-18-21 @ 11:05)    Lactate, Blood: 0.8 mmol/L (04-18-21 @ 11:05)    Auto Eosinophil #: 0.23 K/uL (04-27-21 @ 08:00)  Auto Eosinophil #: 0.30 K/uL (04-26-21 @ 09:16)  Auto Eosinophil #: 0.16 K/uL (04-20-21 @ 06:53)  Auto Eosinophil #: 0.12 K/uL (04-18-21 @ 11:05)        Activated Partial Thromboplastin Time: 31.5 sec (04-18-21 @ 11:05)  INR: 1.10 ratio (04-18-21 @ 11:05)          MICROBIOLOGY:              RADIOLOGY & ADDITIONAL STUDIES:

## 2021-04-27 NOTE — PROGRESS NOTE ADULT - SUBJECTIVE AND OBJECTIVE BOX
Patient is a 61y old  Male who presents with a chief complaint of     INTERVAL HPI/OVERNIGHT EVENTS:  no overnight events, spoke with daughter over face time and she translated, he feels some pain on right arm, otherwise he's eating fine and sleeping fine.     MEDICATIONS  (STANDING):  cefTRIAXone   IVPB 1000 milliGRAM(s) IV Intermittent every 24 hours  doxazosin 2 milliGRAM(s) Oral daily    MEDICATIONS  (PRN):      Allergies    No Known Allergies    Intolerances        Vital Signs Last 24 Hrs  T(C): 37.5 (27 Apr 2021 10:50), Max: 37.5 (27 Apr 2021 10:50)  T(F): 99.5 (27 Apr 2021 10:50), Max: 99.5 (27 Apr 2021 10:50)  HR: 66 (27 Apr 2021 10:50) (58 - 66)  BP: 143/76 (27 Apr 2021 10:50) (130/72 - 146/78)  BP(mean): --  RR: 18 (27 Apr 2021 10:50) (16 - 18)  SpO2: 97% (27 Apr 2021 10:50) (97% - 99%)    PHYSICAL EXAM:  GENERAL: NAD  HEAD:  Atraumatic, Normocephalic  EYES: EOMI, PERRLA, conjunctiva and sclera clear  ENMT: No tonsillar erythema, exudates, or enlargement; Moist mucous membranes  NECK: Supple, No JVD, Normal thyroid  CHEST/LUNG: Clear to percussion bilaterally; No rales, rhonchi, wheezing, or rubs  HEART: Regular rate and rhythm; No murmurs, rubs, or gallops  ABDOMEN: Soft, Nontender, Nondistended; Bowel sounds present  EXTREMITIES: Right elbow in bandage  NERVOUS SYSTEM:  Alert & Oriented X3, Good concentration; Motor Strength 5/5 B/L upper and lower extremities; DTRs 2+ intact and symmetric  SKIN: No rashes or lesions    LABS:                        14.5   8.21  )-----------( 326      ( 27 Apr 2021 08:00 )             42.4     27 Apr 2021 08:00    137    |  103    |  16     ----------------------------<  100    4.1     |  27     |  1.05     Ca    8.9        27 Apr 2021 08:00    TPro  7.2    /  Alb  3.3    /  TBili  0.4    /  DBili  x      /  AST  25     /  ALT  47     /  AlkPhos  88     27 Apr 2021 08:00        CAPILLARY BLOOD GLUCOSE          RADIOLOGY & ADDITIONAL TESTS:    Imaging Personally Reviewed:  [ ] YES     Consultant(s) Notes Reviewed:      Care Discussed with Consultants/Other Providers:    Advanced Directives: [ ] DNR  [ ] No feeding tube  [ ] MOLST in chart  [ ] MOLST completed today  [ ] Unknown

## 2021-04-28 ENCOUNTER — NON-APPOINTMENT (OUTPATIENT)
Age: 61
End: 2021-04-28

## 2021-04-28 DIAGNOSIS — L02.413 CUTANEOUS ABSCESS OF RIGHT UPPER LIMB: ICD-10-CM

## 2021-04-28 LAB
-  AMIKACIN: SIGNIFICANT CHANGE UP
-  AMOXICILLIN/CLAVULANIC ACID: SIGNIFICANT CHANGE UP
-  AMPICILLIN/SULBACTAM: SIGNIFICANT CHANGE UP
-  AMPICILLIN/SULBACTAM: SIGNIFICANT CHANGE UP
-  AMPICILLIN: SIGNIFICANT CHANGE UP
-  AZTREONAM: SIGNIFICANT CHANGE UP
-  CEFAZOLIN: SIGNIFICANT CHANGE UP
-  CEFAZOLIN: SIGNIFICANT CHANGE UP
-  CEFEPIME: SIGNIFICANT CHANGE UP
-  CEFOXITIN: SIGNIFICANT CHANGE UP
-  CEFTRIAXONE: SIGNIFICANT CHANGE UP
-  CIPROFLOXACIN: SIGNIFICANT CHANGE UP
-  CLINDAMYCIN: SIGNIFICANT CHANGE UP
-  ERTAPENEM: SIGNIFICANT CHANGE UP
-  ERYTHROMYCIN: SIGNIFICANT CHANGE UP
-  GENTAMICIN: SIGNIFICANT CHANGE UP
-  GENTAMICIN: SIGNIFICANT CHANGE UP
-  IMIPENEM: SIGNIFICANT CHANGE UP
-  LEVOFLOXACIN: SIGNIFICANT CHANGE UP
-  MEROPENEM: SIGNIFICANT CHANGE UP
-  OXACILLIN: SIGNIFICANT CHANGE UP
-  PENICILLIN: SIGNIFICANT CHANGE UP
-  PIPERACILLIN/TAZOBACTAM: SIGNIFICANT CHANGE UP
-  RIFAMPIN: SIGNIFICANT CHANGE UP
-  TETRACYCLINE: SIGNIFICANT CHANGE UP
-  TOBRAMYCIN: SIGNIFICANT CHANGE UP
-  TRIMETHOPRIM/SULFAMETHOXAZOLE: SIGNIFICANT CHANGE UP
-  TRIMETHOPRIM/SULFAMETHOXAZOLE: SIGNIFICANT CHANGE UP
-  VANCOMYCIN: SIGNIFICANT CHANGE UP
METHOD TYPE: SIGNIFICANT CHANGE UP
METHOD TYPE: SIGNIFICANT CHANGE UP

## 2021-04-28 PROCEDURE — 99233 SBSQ HOSP IP/OBS HIGH 50: CPT

## 2021-04-28 RX ADMIN — CEFTRIAXONE 100 MILLIGRAM(S): 500 INJECTION, POWDER, FOR SOLUTION INTRAMUSCULAR; INTRAVENOUS at 09:58

## 2021-04-28 RX ADMIN — Medication 2 MILLIGRAM(S): at 05:29

## 2021-04-28 NOTE — PROGRESS NOTE ADULT - SUBJECTIVE AND OBJECTIVE BOX
[facility] DIVISION of INFECTIOUS DISEASE  Elton Maier MD PhD, Josiane Nuñez MD, Larisa North MD, Arcelia Pal MD  and providing coverage with Vidya Mederos MD and Willard Monique MD  Providing Infectious Disease Consultations at [facility], [facility], [facility]'s    Office# [phone] to schedule follow up appointments  Answering Service for urgent calls or New Consults [phone]  Cell# to text for urgent issues Elton Maier [phone]     infectious diseases progress note:    ISAC SCHULTZ is a 61y y. o. Male patient    No concerning overnight events    Allergies    No Known Allergies    Intolerances        ANTIBIOTICS/RELEVANT:  antimicrobials  cefTRIAXone   IVPB 1000 milliGRAM(s) IV Intermittent every 24 hours    immunologic:    OTHER:  doxazosin 2 milliGRAM(s) Oral daily      Objective:  Vital Signs Last 24 Hrs  T(C): 37 (28 Apr 2021 05:16), Max: 37.5 (27 Apr 2021 10:50)  T(F): 98.6 (28 Apr 2021 05:16), Max: 99.5 (27 Apr 2021 10:50)  HR: 64 (28 Apr 2021 05:16) (60 - 71)  BP: 141/83 (28 Apr 2021 05:16) (125/70 - 143/76)  BP(mean): --  RR: 15 (28 Apr 2021 05:16) (15 - 19)  SpO2: 96% (28 Apr 2021 05:16) (96% - 98%)    T(C): 37 (04-28-21 @ 05:16), Max: 37.5 (04-27-21 @ 10:50)  T(C): 37 (04-28-21 @ 05:16), Max: 37.5 (04-27-21 @ 10:50)  T(C): 37 (04-28-21 @ 05:16), Max: 37.5 (04-27-21 @ 10:50)    PHYSICAL EXAM:  HEENT: NC atraumatic  Neck: supple  Respiratory: no accessory muscle use, breathing comfortably  Cardiovascular: distant  Gastrointestinal: normal appearing, nondistended  Extremities: no clubbing, no cyanosis, right arm improving        LABS:                          14.5   8.21  )-----------( 326      ( 27 Apr 2021 08:00 )             42.4       8.21 04-27 @ 08:00  8.78 04-26 @ 09:16      04-27    137  |  103  |  16  ----------------------------<  100<H>  4.1   |  27  |  1.05    Ca    8.9      27 Apr 2021 08:00    TPro  7.2  /  Alb  3.3  /  TBili  0.4  /  DBili  x   /  AST  25  /  ALT  47  /  AlkPhos  88  04-27      Creatinine, Serum: 1.05 mg/dL (04-27-21 @ 08:00)  Creatinine, Serum: 1.14 mg/dL (04-26-21 @ 09:16)      INFLAMMATORY MARKERS  Auto Neutrophil #: 5.10 K/uL (04-27-21 @ 08:00)  Auto Lymphocyte #: 2.23 K/uL (04-27-21 @ 08:00)  Auto Neutrophil #: 5.43 K/uL (04-26-21 @ 09:16)  Auto Lymphocyte #: 2.33 K/uL (04-26-21 @ 09:16)  Auto Neutrophil #: 5.76 K/uL (04-20-21 @ 06:53)  Auto Lymphocyte #: 2.09 K/uL (04-20-21 @ 06:53)  Auto Neutrophil #: 8.75 K/uL (04-18-21 @ 11:05)  Auto Lymphocyte #: 2.38 K/uL (04-18-21 @ 11:05)    Lactate, Blood: 0.8 mmol/L (04-18-21 @ 11:05)    Auto Eosinophil #: 0.23 K/uL (04-27-21 @ 08:00)  Auto Eosinophil #: 0.30 K/uL (04-26-21 @ 09:16)  Auto Eosinophil #: 0.16 K/uL (04-20-21 @ 06:53)  Auto Eosinophil #: 0.12 K/uL (04-18-21 @ 11:05)      Activated Partial Thromboplastin Time: 31.5 sec (04-18-21 @ 11:05)  INR: 1.10 ratio (04-18-21 @ 11:05)          MICROBIOLOGY:      Culture - Abscess with Gram Stain (04.26.21 @ 13:30)    -  Vancomycin: S 1    -  Amikacin: S <=16    -  Amoxicillin/Clavulanic Acid: I 16/8    -  Ampicillin: R <=8 These ampicillin results predict results for amoxicillin    -  Ampicillin/Sulbactam: S <=4/2 Enterobacter, Citrobacter, and Serratia may develop resistance during prolonged therapy (3-4 days)    -  Ampicillin/Sulbactam: S <=8/4    -  Aztreonam: S <=4    -  Cefazolin: S <=2 Enterobacter, Citrobacter, and Serratia may develop resistance during prolonged therapy (3-4 days)    -  Cefazolin: S <=4    -  Cefepime: S <=2    -  Cefoxitin: R >16    -  Ceftriaxone: S <=1 Enterobacter, Citrobacter, and Serratia may develop resistance during prolonged therapy    -  Ciprofloxacin: S <=0.25    -  Clindamycin: S <=0.25    -  Ertapenem: S <=0.5    -  Erythromycin: S <=0.25    -  Gentamicin: S <=1 Should not be used as monotherapy    -  Gentamicin: S <=2    -  Imipenem: S <=1    -  Levofloxacin: S <=0.5    -  Meropenem: S <=1    -  Oxacillin: S <=0.25    -  Penicillin: R 8    -  Piperacillin/Tazobactam: S <=8    -  RIF- Rifampin: S <=1 Should not be used as monotherapy    -  Tetra/Doxy: S <=1    -  Tobramycin: S <=2    -  Trimethoprim/Sulfamethoxazole: S <=0.5/9.5    -  Trimethoprim/Sulfamethoxazole: S <=0.5/9.5    Specimen Source: .Abscess Right elbow    Culture Results:   Moderate Staphylococcus aureus  Rare Klebsiella variicola  Rare Coag Negative Staphylococcus "Susceptibilities not performed"    Organism Identification: Staphylococcus aureus  Klebsiella variicola    Organism: Staphylococcus aureus    Organism: Klebsiella variicola    Method Type: VIVIAN    Method Type: VIVIAN        RADIOLOGY & ADDITIONAL STUDIES:

## 2021-04-28 NOTE — PROGRESS NOTE ADULT - SUBJECTIVE AND OBJECTIVE BOX
Patient is a 61y old  Male who presents with a chief complaint of Cellulitis (28 Apr 2021 09:20)      INTERVAL HPI/OVERNIGHT EVENTS:  History obtained with .  Pt is seen and examined.  feels better.    Pain Location & Control:     MEDICATIONS  (STANDING):  cefTRIAXone   IVPB 1000 milliGRAM(s) IV Intermittent every 24 hours  doxazosin 2 milliGRAM(s) Oral daily    MEDICATIONS  (PRN):      Allergies    No Known Allergies    Intolerances      Vital Signs Last 24 Hrs  T(C): 37 (28 Apr 2021 05:16), Max: 37.5 (27 Apr 2021 10:50)  T(F): 98.6 (28 Apr 2021 05:16), Max: 99.5 (27 Apr 2021 10:50)  HR: 64 (28 Apr 2021 05:16) (60 - 71)  BP: 141/83 (28 Apr 2021 05:16) (125/70 - 143/76)  BP(mean): --  RR: 15 (28 Apr 2021 05:16) (15 - 19)  SpO2: 96% (28 Apr 2021 05:16) (96% - 98%)        LABS:      Ca    8.9        27 Apr 2021 08:00    CAPILLARY BLOOD GLUCOSE      Cultures  Culture Results:   Moderate Staphylococcus aureus  Rare Klebsiella variicola  Rare Coag Negative Staphylococcus "Susceptibilities not performed" (04-26 @ 13:30)        Culture - Abscess with Gram Stain (collected 04-26-21 @ 13:30)  Source: .Abscess Right elbow  Preliminary Report (04-28-21 @ 09:16):    Moderate Staphylococcus aureus    Rare Klebsiella variicola    Rare Coag Negative Staphylococcus "Susceptibilities not performed"  Organism: Staphylococcus aureus  Klebsiella variicola (04-28-21 @ 09:11)  Organism: Klebsiella variicola (04-28-21 @ 09:11)      -  Amikacin: S <=16      -  Amoxicillin/Clavulanic Acid: I 16/8      -  Ampicillin: R <=8 These ampicillin results predict results for amoxicillin      -  Ampicillin/Sulbactam: S <=4/2 Enterobacter, Citrobacter, and Serratia may develop resistance during prolonged therapy (3-4 days)      -  Aztreonam: S <=4      -  Cefazolin: S <=2 Enterobacter, Citrobacter, and Serratia may develop resistance during prolonged therapy (3-4 days)      -  Cefepime: S <=2      -  Cefoxitin: R >16      -  Ceftriaxone: S <=1 Enterobacter, Citrobacter, and Serratia may develop resistance during prolonged therapy      -  Ciprofloxacin: S <=0.25      -  Ertapenem: S <=0.5      -  Gentamicin: S <=2      -  Imipenem: S <=1      -  Levofloxacin: S <=0.5      -  Meropenem: S <=1      -  Piperacillin/Tazobactam: S <=8      -  Tobramycin: S <=2      -  Trimethoprim/Sulfamethoxazole: S <=0.5/9.5      Method Type: VIVIAN  Organism: Staphylococcus aureus (04-28-21 @ 09:11)      -  Ampicillin/Sulbactam: S <=8/4      -  Cefazolin: S <=4      -  Clindamycin: S <=0.25      -  Erythromycin: S <=0.25      -  Gentamicin: S <=1 Should not be used as monotherapy      -  Oxacillin: S <=0.25      -  Penicillin: R 8      -  RIF- Rifampin: S <=1 Should not be used as monotherapy      -  Tetra/Doxy: S <=1      -  Trimethoprim/Sulfamethoxazole: S <=0.5/9.5      -  Vancomycin: S 1      Method Type: VIVIAN        RADIOLOGY & ADDITIONAL TESTS:    Imaging Personally Reviewed:  [ ] YES  [ ] NO    Consultant(s) Notes Reviewed:  [ ] YES  [ ] NO    Care Discussed with Consultants/Other Providers [x ] YES  [ ] NO

## 2021-04-28 NOTE — PROGRESS NOTE ADULT - ASSESSMENT
62 yo male pmh htn admitted for right elbow cellulitis/abscess    #Right Elbow Cellulitis/Abscess  s/p I and D, wound care consulted to assess incision site.   Dr. Pena from ID switched abx to rocephin, recs appreciated  f/u cultures    #HTN  on doxasosin    #DVT proph  Ambulate  SCDs    Plan of care was discuss with patient, all questions were answered, seems understand and in agreement.

## 2021-04-28 NOTE — CONSULT NOTE ADULT - ASSESSMENT
1. Right upper extremity brachial region 2 x 2 undermining 12-12 -  4.5 x 7.5cm, erythema, edema, TTP thick sanguinous fluid, erythema, TTP, edema: no odor    Recommend: DEBBIE packed into wound every other day.   1. Right upper extremity elbow/brachial region 2 x 2 undermining 12-12 -  4.5 x 7.5cm, erythema, edema, TTP thick sanguinous fluid, erythema, TTP, edema: no odor    Recommend: DEBBIE packed into wound every other day.

## 2021-04-28 NOTE — PROGRESS NOTE ADULT - ASSESSMENT
62 yo [place] male with hx of HTN presents to the ER with right elbow pain,  and redness. previously  seen at Urgent care , abscess opened and packing placed then addmitted and discharged on Bactrim returns 4/26 with worsening induration and pain    Right Elbow Cellulitis w Abscess  noted Klebsiella and MSSA and discharged on appropriate abx repeat I+D required 4/26  agreed with escalation to IV  4/27-changed to ceftriaxone and will follow for timing of change to PO  4/28- suspect problem was inadequate site control so now post I+D improving, continue IV abx but antiticpate ability to finish course with Keflex 500mg PO QID with last day 5/6

## 2021-04-29 ENCOUNTER — TRANSCRIPTION ENCOUNTER (OUTPATIENT)
Age: 61
End: 2021-04-29

## 2021-04-29 ENCOUNTER — NON-APPOINTMENT (OUTPATIENT)
Age: 61
End: 2021-04-29

## 2021-04-29 PROCEDURE — 99222 1ST HOSP IP/OBS MODERATE 55: CPT

## 2021-04-29 PROCEDURE — 99239 HOSP IP/OBS DSCHRG MGMT >30: CPT

## 2021-04-29 RX ORDER — CEPHALEXIN 500 MG
1 CAPSULE ORAL
Qty: 28 | Refills: 0
Start: 2021-04-29 | End: 2021-05-05

## 2021-04-29 RX ADMIN — CEFTRIAXONE 100 MILLIGRAM(S): 500 INJECTION, POWDER, FOR SOLUTION INTRAMUSCULAR; INTRAVENOUS at 09:00

## 2021-04-29 RX ADMIN — Medication 2 MILLIGRAM(S): at 05:29

## 2021-04-29 NOTE — DISCHARGE NOTE NURSING/CASE MANAGEMENT/SOCIAL WORK - PATIENT PORTAL LINK FT
You can access the FollowMyHealth Patient Portal offered by [facility] by registering at the following website: [email or web address]. By joining [facility]’s FollowMyHealth portal, you will also be able to view your health information using other applications (apps) compatible with our system.

## 2021-04-29 NOTE — PROGRESS NOTE ADULT - SUBJECTIVE AND OBJECTIVE BOX
[facility] DIVISION of INFECTIOUS DISEASE  Elton Maier MD PhD, Josiane Nuñez MD, Larisa North MD, Arcelia Pal MD  and providing coverage with Vidya Mederos MD and Willard Monique MD  Providing Infectious Disease Consultations at [facility], [facility], [facility]'s    Office# [phone] to schedule follow up appointments  Answering Service for urgent calls or New Consults [phone]  Cell# to text for urgent issues Elton Maier [phone]     infectious diseases progress note:    ISAC SCHULTZ is a 61y y. o. Male patient    No concerning overnight events    Allergies    No Known Allergies    Intolerances        ANTIBIOTICS/RELEVANT:  antimicrobials  cefTRIAXone   IVPB 1000 milliGRAM(s) IV Intermittent every 24 hours    immunologic:    OTHER:  doxazosin 2 milliGRAM(s) Oral daily      Objective:  Vital Signs Last 24 Hrs  T(C): 36.7 (29 Apr 2021 05:49), Max: 36.8 (28 Apr 2021 14:10)  T(F): 98.1 (29 Apr 2021 05:49), Max: 98.2 (28 Apr 2021 14:10)  HR: 69 (29 Apr 2021 05:49) (68 - 72)  BP: 119/74 (29 Apr 2021 05:49) (119/74 - 130/71)  BP(mean): --  RR: 16 (29 Apr 2021 05:49) (16 - 17)  SpO2: 98% (29 Apr 2021 05:49) (95% - 99%)    T(C): 36.7 (04-29-21 @ 05:49), Max: 37.5 (04-27-21 @ 10:50)  T(C): 36.7 (04-29-21 @ 05:49), Max: 37.5 (04-27-21 @ 10:50)  T(C): 36.7 (04-29-21 @ 05:49), Max: 37.5 (04-27-21 @ 10:50)    PHYSICAL EXAM:  HEENT: NC atraumatic  Neck: supple  Respiratory: no accessory muscle use, breathing comfortably  Cardiovascular: distant  Gastrointestinal: normal appearing, nondistended  Extremities: no clubbing, no cyanosis, right elbow with induration, erythema., packed with noted undermining but nontender, no able to express any purulence        LABS:        8.21 04-27 @ 08:00  8.78 04-26 @ 09:16              Creatinine, Serum: 1.05 mg/dL (04-27-21 @ 08:00)  Creatinine, Serum: 1.14 mg/dL (04-26-21 @ 09:16)                INFLAMMATORY MARKERS  Auto Neutrophil #: 5.10 K/uL (04-27-21 @ 08:00)  Auto Lymphocyte #: 2.23 K/uL (04-27-21 @ 08:00)  Auto Neutrophil #: 5.43 K/uL (04-26-21 @ 09:16)  Auto Lymphocyte #: 2.33 K/uL (04-26-21 @ 09:16)  Auto Neutrophil #: 5.76 K/uL (04-20-21 @ 06:53)  Auto Lymphocyte #: 2.09 K/uL (04-20-21 @ 06:53)  Auto Neutrophil #: 8.75 K/uL (04-18-21 @ 11:05)  Auto Lymphocyte #: 2.38 K/uL (04-18-21 @ 11:05)    Lactate, Blood: 0.8 mmol/L (04-18-21 @ 11:05)    Auto Eosinophil #: 0.23 K/uL (04-27-21 @ 08:00)  Auto Eosinophil #: 0.30 K/uL (04-26-21 @ 09:16)  Auto Eosinophil #: 0.16 K/uL (04-20-21 @ 06:53)  Auto Eosinophil #: 0.12 K/uL (04-18-21 @ 11:05)          Activated Partial Thromboplastin Time: 31.5 sec (04-18-21 @ 11:05)  INR: 1.10 ratio (04-18-21 @ 11:05)          MICROBIOLOGY:              RADIOLOGY & ADDITIONAL STUDIES:

## 2021-04-29 NOTE — DISCHARGE NOTE NURSING/CASE MANAGEMENT/SOCIAL WORK - NSDCFUADDAPPT_GEN_ALL_CORE_FT
You have a post-hospitalization doctor appointment set-up for you with [facility] [phone] on Friday 05/07/2021 @ 9AM, if there is any conflict with any previous appointment then you may call doctor's office and re-schedule @ your earliest convenience.  You have a post-hospitalization doctor appointment set-up for you with [facility] [phone] @ [address] [place] [address] [address], [place] [address] on Friday 05/07/2021 @ 9AM, if there is any conflict with any previous appointment then you may call doctor's office and re-schedule @ your earliest convenience.

## 2021-04-29 NOTE — DISCHARGE NOTE PROVIDER - CARE PROVIDERS DIRECT ADDRESSES
,DirectAddress_Unknown ,DirectAddress_Unknown,[email or web address]

## 2021-04-29 NOTE — DISCHARGE NOTE PROVIDER - NSDCFUADDINST_GEN_ALL_CORE_FT
f/u [place] wound [facility] in 5-7 days.  f/u PMD in 7 days. f/u [place] wound clinic in 5-7 days.  f/u PMD in 7 days.  DEBBIE packed into wound every other day.

## 2021-04-29 NOTE — DISCHARGE NOTE PROVIDER - CARE PROVIDER_API CALL
venu castellanos  Phone: [phone]  Fax: (   )    -  Follow Up Time:    venu castellanos  Phone: [phone]  Fax: (   )    -  Follow Up Time:     Gordy Chung)  Surgery  [address] [place] [address] [id number]  Phone: [phone]  Fax: [phone]  Follow Up Time:

## 2021-04-29 NOTE — PROGRESS NOTE ADULT - ASSESSMENT
60 yo [place] male with hx of HTN presents to the ER with right elbow pain,  and redness. previously  seen at Urgent care , abscess opened and packing placed then addmitted and discharged on Bactrim returns 4/26 with worsening induration and pain    Right Elbow Cellulitis w Abscess  noted Klebsiella and MSSA and discharged on appropriate abx repeat I+D required 4/26  agreed with escalation to IV    - suspect inital problem was inadequate site control so now post I+D improvin, no obv areas of undrained collections, when ready for dc fine to finish course with   Keflex 500mg PO QID with last day 5/6. From ID standpoint potential dc today but pt will need follow up for wound care

## 2021-04-29 NOTE — PROGRESS NOTE ADULT - SUBJECTIVE AND OBJECTIVE BOX
Patient is a 61y old  Male who presents with a chief complaint of Right Antecubital Fossa Abscess (29 Apr 2021 19:43)      INTERVAL HPI/OVERNIGHT EVENTS:  Pt is seen and examined.  History obtained with .  Sohan pain, feels better.    Pain Location & Control:     MEDICATIONS  (STANDING):  cefTRIAXone   IVPB 1000 milliGRAM(s) IV Intermittent every 24 hours  doxazosin 2 milliGRAM(s) Oral daily    MEDICATIONS  (PRN):      Allergies    No Known Allergies    Intolerances        Cultures  Culture Results:   Moderate Staphylococcus aureus  Rare Klebsiella variicola  Rare Coag Negative Staphylococcus "Susceptibilities not performed" (04-26 @ 13:30)        Culture - Abscess with Gram Stain (collected 04-26-21 @ 13:30)  Source: .Abscess Right elbow  Preliminary Report (04-28-21 @ 09:16):    Moderate Staphylococcus aureus    Rare Klebsiella variicola    Rare Coag Negative Staphylococcus "Susceptibilities not performed"  Organism: Staphylococcus aureus  Klebsiella variicola (04-28-21 @ 09:11)  Organism: Klebsiella variicola (04-28-21 @ 09:11)      -  Amikacin: S <=16      -  Amoxicillin/Clavulanic Acid: I 16/8      -  Ampicillin: R <=8 These ampicillin results predict results for amoxicillin      -  Ampicillin/Sulbactam: S <=4/2 Enterobacter, Citrobacter, and Serratia may develop resistance during prolonged therapy (3-4 days)      -  Aztreonam: S <=4      -  Cefazolin: S <=2 Enterobacter, Citrobacter, and Serratia may develop resistance during prolonged therapy (3-4 days)      -  Cefepime: S <=2      -  Cefoxitin: R >16      -  Ceftriaxone: S <=1 Enterobacter, Citrobacter, and Serratia may develop resistance during prolonged therapy      -  Ciprofloxacin: S <=0.25      -  Ertapenem: S <=0.5      -  Gentamicin: S <=2      -  Imipenem: S <=1      -  Levofloxacin: S <=0.5      -  Meropenem: S <=1      -  Piperacillin/Tazobactam: S <=8      -  Tobramycin: S <=2      -  Trimethoprim/Sulfamethoxazole: S <=0.5/9.5      Method Type: VIVIAN  Organism: Staphylococcus aureus (04-28-21 @ 09:11)      -  Ampicillin/Sulbactam: S <=8/4      -  Cefazolin: S <=4      -  Clindamycin: S <=0.25      -  Erythromycin: S <=0.25      -  Gentamicin: S <=1 Should not be used as monotherapy      -  Oxacillin: S <=0.25      -  Penicillin: R 8      -  RIF- Rifampin: S <=1 Should not be used as monotherapy      -  Tetra/Doxy: S <=1      -  Trimethoprim/Sulfamethoxazole: S <=0.5/9.5      -  Vancomycin: S 1      Method Type: VIVIAN        RADIOLOGY & ADDITIONAL TESTS:    Imaging Personally Reviewed:  [ ] YES  [ ] NO    Consultant(s) Notes Reviewed:  [ ] YES  [ ] NO    Care Discussed with Consultants/Other Providers [ x] YES  [ ] NO

## 2021-04-29 NOTE — DISCHARGE NOTE PROVIDER - NSDCFUADDAPPT_GEN_ALL_CORE_FT
You have a post-hospitalization doctor appointment set-up for you with [facility] [phone] on Friday 05/07/2021 @ 9AM, if there is any conflict with any previous appointment then you may call doctor's office and re-schedule @ your earliest convenience.

## 2021-04-29 NOTE — CONSULT NOTE ADULT - ASSESSMENT
Abscess of right antecubital fossa.  S/P prior Incision and Drainage.  Packing removed personally tonight-  -unclear to me as to when last replaced.  Patient without signs of systemic sepsis.  However, local inflammation still significant with impressive erythema and induration.  Packing replaced.  New DSD applied.  Will re-examine in am.  Follow-up labs ordered for am.  This is a complex cavity with ? area of counter drainage and undermining.  Given his failure to follow-up with PMD, his readmission and the difficulty of local care, I would not feel comfortable with discharge at this time.  Plan is to continue present care.  Will reassess overall progress in am and see if further unroofing is required.  Once ready for discharge VNS should be in place to assist on outpatient basis.  Following with you...

## 2021-04-29 NOTE — DISCHARGE NOTE PROVIDER - HOSPITAL COURSE
60 yo male pmh htn admitted for right elbow cellulitis/abscess    #Right Elbow Cellulitis/Abscess  s/p I and D, seeb by wound care, Recommend: DEBBIE packed into wound every other day.    noted Klebsiella and MSSA and discharged on appropriate abx repeat I+D required 4/26  agreed with escalation to IV    - suspect inital problem was inadequate site control so now post I+D improvin, no obv areas of undrained collections, may finish course with   Keflex 500mg PO QID with last day 5/6.     f/u PMD in 7-10 days.   F/U wound care in 4-5 days.    #HTN  on doxasosin      T(C): 36.7 (04-29-21 @ 10:49), Max: 36.8 (04-28-21 @ 14:10)  HR: 72 (04-29-21 @ 10:49) (69 - 72)  BP: 105/66 (04-29-21 @ 10:49) (105/66 - 128/75)  RR: 18 (04-29-21 @ 10:49) (16 - 18)  SpO2: 98% (04-29-21 @ 10:49) (95% - 99%)  Wt(kg): --  REVIEW OF SYSTEMS:    CONSTITUTIONAL: No weakness, fevers or chills  EYES/ENT: No visual changes;  No vertigo or throat pain   NECK: No pain or stiffness  RESPIRATORY: No cough, wheezing, hemoptysis; No shortness of breath  CARDIOVASCULAR: No chest pain or palpitations  GASTROINTESTINAL: No abdominal or epigastric pain. No nausea, vomiting, or hematemesis; No diarrhea or constipation. No melena or hematochezia.  GENITOURINARY: No dysuria, frequency or hematuria  NEUROLOGICAL: No numbness or weakness  SKIN: No itching, burning, rashes, or lesions   All other review of systems is negative unless indicated above.  PHYSICAL EXAM:  GENERAL: NAD, well-groomed, well-developed  HEAD:  Atraumatic, Normocephalic  EYES: EOMI, PERRLA, conjunctiva and sclera clear  ENMT: No tonsillar erythema, exudates, or enlargement; Moist mucous membranes, Good dentition, No lesions  NECK: Supple, No JVD, Normal thyroid  NERVOUS SYSTEM:  Alert & Oriented X3, Good concentration; Motor Strength 5/5 B/L upper and lower extremities; DTRs 2+ intact and symmetric  CHEST/LUNG: Clear to auscultation bilaterally; No rales, rhonchi, wheezing, or rubs  HEART: Regular rate and rhythm; No murmurs, rubs, or gallops  ABDOMEN: Soft, Nontender, Nondistended; Bowel sounds present  EXTREMITIES:  2+ Peripheral Pulses, No clubbing or cyanosis  Right elbow- Right upper extremity elbow/brachial region 2 x 2 undermining 12-12 -  4.5 x 7.5cm, erythema, edema, TTP thick sanguinous fluid, erythema, edema: no odor    PMD notified-  Time Spent 60 minutes.       62 yo male pmh htn admitted for right elbow cellulitis/abscess    #Right Elbow Cellulitis/Abscess  s/p I and D, seen by wound care, Recommend: DEBBIE packed into wound every other day.    noted Klebsiella and MSSA and discharged on appropriate abx repeat I+D required 4/26  Was treated with IV antibiotics.      - suspect inital problem was inadequate site control so now post I+D improvin, no obv areas of undrained collections, may finish course with   Keflex 500mg PO QID with last day 5/6.   F/U surgeon in 1 week.    f/u PMD in 7-10 days.   F/U wound care in 4-5 days.    #HTN  on doxasosin          PMD notified- Yes   Time Spent 60 minutes.

## 2021-04-29 NOTE — DISCHARGE NOTE PROVIDER - NSDCMRMEDTOKEN_GEN_ALL_CORE_FT
doxazosin 2 mg oral tablet: 1 tab(s) orally once a day  Keflex 500 mg oral capsule: 1 cap(s) orally every 6 hours

## 2021-04-29 NOTE — PROGRESS NOTE ADULT - ASSESSMENT
62 yo male pmh htn admitted for right elbow cellulitis/abscess    #Right Elbow Cellulitis/Abscess  s/p I and D, wound care consulted to assess incision site.   Dr. Pena from ID switched abx to rocephin, recs appreciated  wound culture klebsilla and MSSA.  Surgery consult .    #HTN  on doxasosin    #DVT proph  Ambulate  SCDs    Plan of care was discuss with patient, all questions were answered, seems understand and in agreement.

## 2021-04-29 NOTE — CONSULT NOTE ADULT - SUBJECTIVE AND OBJECTIVE BOX
HPI:  60 yo male pmh htn is c/o Right elbow swelling and pain since last night.  No fever, chills.  Pt admitted here recently for the same thing, he was discharged last tuesday on bactrim.  Didn't improve so he's back at the ER.     In the ER, cbc was WNL.  He had a I and D done at bedside by ER provider.  Pt was given Ancef.  He didn't follow up with outside doctor so ER provider want him to be admitted.  (26 Apr 2021 12:50)      PAST MEDICAL & SURGICAL HISTORY:  Hypertension  No significant past surgical history        REVIEW OF SYSTEMS  Patient unwilling to participate in ROS at this time- on phone with family "he says that he just wants to go home."      MEDICATIONS  (STANDING):  cefTRIAXone   IVPB 1000 milliGRAM(s) IV Intermittent every 24 hours  doxazosin 2 milliGRAM(s) Oral daily      No Known Allergies      SOCIAL HISTORY: As per chart, denies tobacco, EtOH or illicit drug use      FAMILY HISTORY:  No pertinent family history in first degree relatives      Vital Signs Last 24 Hrs  T(F): 98.3 (29 Apr 2021 17:08), Max: 98.3 (29 Apr 2021 17:08)  HR: 70 (29 Apr 2021 17:08) (69 - 74)  BP: 155/83 (29 Apr 2021 17:08) (105/66 - 155/83)  RR: 18 (29 Apr 2021 17:08) (16 - 18)  SpO2: 96% (29 Apr 2021 17:08) (96% - 98%)      PHYSICAL EXAM:  Constitutional: Appears anxious but not unwell.    Eyes: PERRL.    ENMT: Moist mucosal membranes.    Neck: Supple with full ROM.    Breasts: No visible mass or palpable lesion.    Back: No CVAT.    Respiratory: Equal expansion.    Cardiovascular: Pulse regular.    Gastrointestinal: Soft and non tense and non tender.    Genitourinary: Deferred.    Rectal: Deferred.    Extremities: Grossly symmetric.    Vascular: Brisk capillary refill.    Neurological: Alert and oriented times three.    Skin: Right antecubital fossa with ~ 1 cm medial opening and ? small (~2 mm) counter lateral area of additional drainage.  Still fairly impressive induration and soft tissue edema with suggestion of additional tunneling.  Only serosanguinous discharge and no malodor.    Lymph Nodes: No cervical, supraclavicular or axillary adenopathy.    Musculoskeletal: Grossly symmetric without obvious motor or sensory deficits.    Psychiatric: Pleasant and interactive, though agitated for discharge.      LABS:    No labs since the 27th.      RADIOLOGY & ADDITIONAL STUDIES:    No imaging this admission.
[facility] DIVISION of INFECTIOUS DISEASE  Elton Maier MD PhD, Josiane Nuñez MD, Larisa North MD, Arcelia Pal MD  and providing coverage with Vidya Mederos MD and Willard Monique MD  Providing Infectious Disease Consultations at [facility], [facility], [facility], [facility]'s    Office# [phone] to schedule follow up appointments  Answering Service for urgent calls or New Consults [phone]  Cell# to text for urgent issues Elton Maier [phone]     HPI: 60 yo [place]ames[place] male with hx of HTN presented to the ER with right elbow pain,  and redness. previously  seen at Urgent care , abscess opened and packing placed then addmitted and discharged on Bactrim returns 4/26 with worsening induration and pain      PAST MEDICAL & SURGICAL HISTORY:  Hypertension    No significant past surgical history        Antimicrobials  ceFAZolin   IVPB      ceFAZolin   IVPB 1000 milliGRAM(s) IV Intermittent every 8 hours      Immunological      Other      Allergies    No Known Allergies    Intolerances        SOCIAL HISTORY:  no toxic habits reported      FAMILY HISTORY:  No pertinent family history in first degree relatives        ROS:    limited    Vital Signs Last 24 Hrs  T(C): 36.7 (26 Apr 2021 08:08), Max: 36.7 (26 Apr 2021 08:08)  T(F): 98.1 (26 Apr 2021 08:08), Max: 98.1 (26 Apr 2021 08:08)  HR: 73 (26 Apr 2021 08:08) (73 - 73)  BP: 129/68 (26 Apr 2021 08:08) (129/68 - 129/68)  BP(mean): --  RR: 14 (26 Apr 2021 08:08) (14 - 14)  SpO2: 100% (26 Apr 2021 08:08) (100% - 100%)    PE:  WDWN in no distress  HEENT:  NC, PERRL, sclerae anicteric, conjunctivae clear, EOMI.  Sinuses nontender, no nasal exudate.  No buccal or pharyngeal lesions, erythema or exudate  Neck:  Supple, no adenopathy  Lungs:  No accessory muscle use, breathing comfortably  Cor:  distant  Abd:  Symmetric, normoactive BS.  Soft, nontender, no masses, guarding or rebound.  Liver and spleen not enlarged  Extrem:  right arm with indurated warm, red area with 2 openings and packing  Neuro: grossly intact  Musc: moving all limbs freely, no focal deficits        LABS:                        14.7   8.78  )-----------( 348      ( 26 Apr 2021 09:16 )             43.8       WBC Count: 8.78 K/uL (04-26-21 @ 09:16)  WBC Count: 8.76 K/uL (04-20-21 @ 06:53)      04-26    138  |  102  |  14  ----------------------------<  122<H>  4.1   |  28  |  1.14    Ca    9.1      26 Apr 2021 09:16    TPro  7.8  /  Alb  3.5  /  TBili  0.4  /  DBili  x   /  AST  27  /  ALT  60  /  AlkPhos  93  04-26      Creatinine, Serum: 1.14 mg/dL (04-26-21 @ 09:16)  Creatinine, Serum: 0.83 mg/dL (04-20-21 @ 06:53)          Vancomycin Level, Trough: 10.8 ug/mL (04-19-21 @ 20:57)        MICROBIOLOGY:      RADIOLOGY & ADDITIONAL STUDIES:    --
  HPI:  60 yo non-english Yakut speaking male with PMHX of HTN he was sent to the hospital by his MD and has a cc of Right elbow swelling and pain.  Pt admitted to hospital recently for the same problem and was d/c', last Tuesday and prescribed bactrim.  I and D done at bedside by ER provider.  Pt was given Ancef.     PAST MEDICAL & SURGICAL HISTORY:  Hypertension    No significant past surgical history	  MEDICATIONS  (STANDING):  cefTRIAXone   IVPB 1000 milliGRAM(s) IV Intermittent every 24 hours  doxazosin 2 milliGRAM(s) Oral daily    Allergies    No Known Allergies    Vital Signs Last 24 Hrs  T(C): 37 (28 Apr 2021 05:16), Max: 37.5 (27 Apr 2021 10:50)  T(F): 98.6 (28 Apr 2021 05:16), Max: 99.5 (27 Apr 2021 10:50)  HR: 64 (28 Apr 2021 05:16) (60 - 71)  BP: 141/83 (28 Apr 2021 05:16) (125/70 - 143/76)  BP(mean): --  RR: 15 (28 Apr 2021 05:16) (15 - 19)  SpO2: 96% (28 Apr 2021 05:16) (96% - 98%)    NAD,  A&Ox3/ Alert    Total Care Sport/ Versa Care P500 bed                          14.5   8.21  )-----------( 326      ( 27 Apr 2021 08:00 )             42.4           Respiratory: CTA    Gastrointestinal soft NT/ND (+)BS    Musculoskeletal/Vascular:  Full ROM  no deformities/ contractures    Skin:  moist w/ good turgor    sanguinous drainage right upper extremity  positive for  erythema, increased warmth, tenderness      con't Nutrition (as tolerated)  con't Offloading   con't Pericare  Care as per medicine will follow w/ you  f/u as outpatient at Wound Center   discussed findings and recommendations with medical team  Thank you for this consult  Pepper Landin NP, [facility] [phone]

## 2021-04-29 NOTE — DISCHARGE NOTE PROVIDER - PROVIDER TOKENS
FREE:[LAST:[emanuel],FIRST:[venu],PHONE:[phone]],FAX:[(   )    -]] FREE:[LAST:[emanuel],FIRST:[venu],PHONE:[phone]],FAX:[(   )    -]],PROVIDER:[TOKEN:[[id number]]]

## 2021-04-29 NOTE — DISCHARGE NOTE NURSING/CASE MANAGEMENT/SOCIAL WORK - NSSCNAMETXT_GEN_ALL_CORE
[facility] [phone] or  [phone]  Nurse to visit the day after hospital discharge. Please contact the home care agency at the above phone number if you have not heard from them by 12 noon on the day after your hospital discharge.

## 2021-04-29 NOTE — DISCHARGE NOTE PROVIDER - NSDCCPCAREPLAN_GEN_ALL_CORE_FT
PRINCIPAL DISCHARGE DIAGNOSIS  Diagnosis: Abscess of elbow  Assessment and Plan of Treatment: #Right Elbow Cellulitis/Abscess  s/p I and D, seeb by wound care, Recommend: DEBBIE packed into wound every other day.    noted Klebsiella and MSSA and discharged on appropriate abx repeat I+D required 4/26  agreed with escalation to IV  - suspect inital problem was inadequate site control so now post I+D improvin, no obv areas of undrained collections, may finish course with   Keflex 500mg PO QID with last day 5/6.   f/u PMD in 7-10 days.   F/U wound care in 4-5 days.  #HTN  on doxasosin       PRINCIPAL DISCHARGE DIAGNOSIS  Diagnosis: Abscess of elbow  Assessment and Plan of Treatment: #Right Elbow Cellulitis/Abscess  s/p I and D, seeb by wound care, Recommend: DEBBIE packed into wound every other day.    noted Klebsiella and MSSA and discharged on appropriate abx repeat I+D required 4/26  was on IV antibiotics.  - suspect inital problem was inadequate site control so now post I+D improvin, no obv areas of undrained collections, may finish course with   Keflex 500mg PO QID with last day 5/6.   - was seen by  surgery.  f/u PMD in 7-10 days.   F/U wound care in 4-5 days.  #HTN  on doxasosin

## 2021-04-30 LAB
ANION GAP SERPL CALC-SCNC: 7 MMOL/L — SIGNIFICANT CHANGE UP (ref 5–17)
BASOPHILS # BLD AUTO: 0.06 K/UL — SIGNIFICANT CHANGE UP (ref 0–0.2)
BASOPHILS NFR BLD AUTO: 0.7 % — SIGNIFICANT CHANGE UP (ref 0–2)
BUN SERPL-MCNC: 11 MG/DL — SIGNIFICANT CHANGE UP (ref 7–23)
CALCIUM SERPL-MCNC: 8.6 MG/DL — SIGNIFICANT CHANGE UP (ref 8.4–10.5)
CHLORIDE SERPL-SCNC: 105 MMOL/L — SIGNIFICANT CHANGE UP (ref 96–108)
CO2 SERPL-SCNC: 28 MMOL/L — SIGNIFICANT CHANGE UP (ref 22–31)
CREAT SERPL-MCNC: 0.91 MG/DL — SIGNIFICANT CHANGE UP (ref 0.5–1.3)
EOSINOPHIL # BLD AUTO: 0.23 K/UL — SIGNIFICANT CHANGE UP (ref 0–0.5)
EOSINOPHIL NFR BLD AUTO: 2.9 % — SIGNIFICANT CHANGE UP (ref 0–6)
GLUCOSE SERPL-MCNC: 132 MG/DL — HIGH (ref 70–99)
HCT VFR BLD CALC: 42.7 % — SIGNIFICANT CHANGE UP (ref 39–50)
HGB BLD-MCNC: 14.1 G/DL — SIGNIFICANT CHANGE UP (ref 13–17)
IMM GRANULOCYTES NFR BLD AUTO: 0.5 % — SIGNIFICANT CHANGE UP (ref 0–1.5)
LYMPHOCYTES # BLD AUTO: 2.19 K/UL — SIGNIFICANT CHANGE UP (ref 1–3.3)
LYMPHOCYTES # BLD AUTO: 27.1 % — SIGNIFICANT CHANGE UP (ref 13–44)
MAGNESIUM SERPL-MCNC: 2 MG/DL — SIGNIFICANT CHANGE UP (ref 1.6–2.6)
MCHC RBC-ENTMCNC: 28.7 PG — SIGNIFICANT CHANGE UP (ref 27–34)
MCHC RBC-ENTMCNC: 33 GM/DL — SIGNIFICANT CHANGE UP (ref 32–36)
MCV RBC AUTO: 87 FL — SIGNIFICANT CHANGE UP (ref 80–100)
MONOCYTES # BLD AUTO: 0.46 K/UL — SIGNIFICANT CHANGE UP (ref 0–0.9)
MONOCYTES NFR BLD AUTO: 5.7 % — SIGNIFICANT CHANGE UP (ref 2–14)
NEUTROPHILS # BLD AUTO: 5.09 K/UL — SIGNIFICANT CHANGE UP (ref 1.8–7.4)
NEUTROPHILS NFR BLD AUTO: 63.1 % — SIGNIFICANT CHANGE UP (ref 43–77)
NRBC # BLD: 0 /100 WBCS — SIGNIFICANT CHANGE UP (ref 0–0)
PLATELET # BLD AUTO: 312 K/UL — SIGNIFICANT CHANGE UP (ref 150–400)
POTASSIUM SERPL-MCNC: 3.6 MMOL/L — SIGNIFICANT CHANGE UP (ref 3.5–5.3)
POTASSIUM SERPL-SCNC: 3.6 MMOL/L — SIGNIFICANT CHANGE UP (ref 3.5–5.3)
RBC # BLD: 4.91 M/UL — SIGNIFICANT CHANGE UP (ref 4.2–5.8)
RBC # FLD: 12 % — SIGNIFICANT CHANGE UP (ref 10.3–14.5)
SARS-COV-2 RNA SPEC QL NAA+PROBE: SIGNIFICANT CHANGE UP
SODIUM SERPL-SCNC: 140 MMOL/L — SIGNIFICANT CHANGE UP (ref 135–145)
WBC # BLD: 8.07 K/UL — SIGNIFICANT CHANGE UP (ref 3.8–10.5)
WBC # FLD AUTO: 8.07 K/UL — SIGNIFICANT CHANGE UP (ref 3.8–10.5)

## 2021-04-30 PROCEDURE — 99233 SBSQ HOSP IP/OBS HIGH 50: CPT

## 2021-04-30 RX ORDER — LORATADINE 10 MG/1
10 TABLET ORAL DAILY
Refills: 0 | Status: DISCONTINUED | OUTPATIENT
Start: 2021-04-30 | End: 2021-05-01

## 2021-04-30 RX ADMIN — CEFTRIAXONE 100 MILLIGRAM(S): 500 INJECTION, POWDER, FOR SOLUTION INTRAMUSCULAR; INTRAVENOUS at 09:18

## 2021-04-30 RX ADMIN — LORATADINE 10 MILLIGRAM(S): 10 TABLET ORAL at 22:04

## 2021-04-30 RX ADMIN — Medication 2 MILLIGRAM(S): at 05:19

## 2021-04-30 NOTE — PROGRESS NOTE ADULT - SUBJECTIVE AND OBJECTIVE BOX
[facility] DIVISION of INFECTIOUS DISEASE  Elton Maier MD PhD, Josiane Nuñez MD, Larisa North MD, Arcelia Pal MD  and providing coverage with Vidya Mederos MD and Willard Monique MD  Providing Infectious Disease Consultations at [facility], [facility], [facility]'s    Office# [phone] to schedule follow up appointments  Answering Service for urgent calls or New Consults [phone]  Cell# to text for urgent issues Elton Maier [phone]     infectious diseases progress note:    ISAC SCHULTZ is a 61y y. o. Male patient    No concerning overnight events    Allergies    No Known Allergies    Intolerances        ANTIBIOTICS/RELEVANT:  antimicrobials  cefTRIAXone   IVPB 1000 milliGRAM(s) IV Intermittent every 24 hours    immunologic:    OTHER:  doxazosin 2 milliGRAM(s) Oral daily      Objective:  Vital Signs Last 24 Hrs  T(C): 36.6 (30 Apr 2021 10:36), Max: 36.8 (29 Apr 2021 17:08)  T(F): 97.8 (30 Apr 2021 10:36), Max: 98.3 (29 Apr 2021 17:08)  HR: 80 (30 Apr 2021 10:36) (60 - 80)  BP: 120/80 (30 Apr 2021 10:36) (119/70 - 155/83)  BP(mean): --  RR: 18 (30 Apr 2021 10:36) (16 - 18)  SpO2: 97% (30 Apr 2021 10:36) (96% - 98%)    T(C): 36.6 (04-30-21 @ 10:36), Max: 36.8 (04-29-21 @ 17:08)  T(C): 36.6 (04-30-21 @ 10:36), Max: 37 (04-28-21 @ 05:16)  T(C): 36.6 (04-30-21 @ 10:36), Max: 37.5 (04-27-21 @ 10:50)    PHYSICAL EXAM:  HEENT: NC atraumatic  Neck: supple  Respiratory: no accessory muscle use, breathing comfortably  Cardiovascular: distant  Gastrointestinal: normal appearing, nondistended  Extremities: no clubbing, no cyanosis, right arm still with induration        LABS:                          14.1   8.07  )-----------( 312      ( 30 Apr 2021 07:41 )             42.7       8.07 04-30 @ 07:41  8.21 04-27 @ 08:00  8.78 04-26 @ 09:16      04-30    140  |  105  |  11  ----------------------------<  132<H>  3.6   |  28  |  0.91    Ca    8.6      30 Apr 2021 07:41  Mg     2.0     04-30        Creatinine, Serum: 0.91 mg/dL (04-30-21 @ 07:41)  Creatinine, Serum: 1.05 mg/dL (04-27-21 @ 08:00)  Creatinine, Serum: 1.14 mg/dL (04-26-21 @ 09:16)                INFLAMMATORY MARKERS  Auto Lymphocyte #: 2.19 K/uL (04-30-21 @ 07:41)  Auto Neutrophil #: 5.09 K/uL (04-30-21 @ 07:41)  Auto Neutrophil #: 5.10 K/uL (04-27-21 @ 08:00)  Auto Lymphocyte #: 2.23 K/uL (04-27-21 @ 08:00)  Auto Neutrophil #: 5.43 K/uL (04-26-21 @ 09:16)  Auto Lymphocyte #: 2.33 K/uL (04-26-21 @ 09:16)  Auto Neutrophil #: 5.76 K/uL (04-20-21 @ 06:53)  Auto Lymphocyte #: 2.09 K/uL (04-20-21 @ 06:53)  Auto Neutrophil #: 8.75 K/uL (04-18-21 @ 11:05)  Auto Lymphocyte #: 2.38 K/uL (04-18-21 @ 11:05)    Lactate, Blood: 0.8 mmol/L (04-18-21 @ 11:05)    Auto Eosinophil #: 0.23 K/uL (04-30-21 @ 07:41)  Auto Eosinophil #: 0.23 K/uL (04-27-21 @ 08:00)  Auto Eosinophil #: 0.30 K/uL (04-26-21 @ 09:16)  Auto Eosinophil #: 0.16 K/uL (04-20-21 @ 06:53)  Auto Eosinophil #: 0.12 K/uL (04-18-21 @ 11:05)                        Activated Partial Thromboplastin Time: 31.5 sec (04-18-21 @ 11:05)  INR: 1.10 ratio (04-18-21 @ 11:05)          MICROBIOLOGY:              RADIOLOGY & ADDITIONAL STUDIES:

## 2021-04-30 NOTE — PROGRESS NOTE ADULT - SUBJECTIVE AND OBJECTIVE BOX
STATUS POST:        POST OPERATIVE DAY #:       Vital Signs Last 24 Hrs  T(C): 36.8 (30 Apr 2021 21:41), Max: 36.8 (30 Apr 2021 17:14)  T(F): 98.3 (30 Apr 2021 21:41), Max: 98.3 (30 Apr 2021 17:14)  HR: 62 (30 Apr 2021 21:41) (61 - 80)  BP: 149/83 (30 Apr 2021 21:41) (120/80 - 150/82)  BP(mean): --  RR: 18 (30 Apr 2021 21:41) (16 - 18)  SpO2: 96% (30 Apr 2021 21:41) (96% - 98%)      SUBJECTIVE: Pt seen      Pain: [ ] YES [ ] NO  Pain (0-10):              SOB: [ ]YES [ ] NO  Chest Pain: [ ] YES [ ] NO    Nausea or Vomiting: [ ] YES [ ] NO           Flatus or Bowel Movement: [ ] YES [ ] NO                Void: [ ]YES [ ]No      General Appearance: Appears well, NAD  Neck: Supple  Chest: Equal expansion bilaterally  CV: Pulse regular presently  Abdomen: Soft, nontense, appropriate incisional tenderness, dressings clean and dry and intact  Extremities: Skin: Right antecubital fossa with ~ 1 cm medial opening and ? small (~2 mm) counter lateral area of additional drainage.  Still fairly impressive induration and soft tissue edema with suggestion of additional tunneling.  Only serosanguinous discharge and no malodor.      I&O's Detail        MEDICATIONS  (STANDING):  cefTRIAXone   IVPB 1000 milliGRAM(s) IV Intermittent every 24 hours  doxazosin 2 milliGRAM(s) Oral daily  loratadine 10 milliGRAM(s) Oral daily    MEDICATIONS  (PRN):        LABS:                        14.1   8.07  )-----------( 312      ( 30 Apr 2021 07:41 )             42.7     04-30    140  |  105  |  11  ----------------------------<  132<H>  3.6   |  28  |  0.91    Ca    8.6      30 Apr 2021 07:41  Mg     2.0     04-30              RADIOLOGY & ADDITIONAL STUDIES:     STATUS POST:  ER I & D      POST OPERATIVE DAY #: 4      Vital Signs Last 24 Hrs  T(C): 36.8 (30 Apr 2021 21:41), Max: 36.8 (30 Apr 2021 17:14)  T(F): 98.3 (30 Apr 2021 21:41), Max: 98.3 (30 Apr 2021 17:14)  HR: 62 (30 Apr 2021 21:41) (61 - 80)  BP: 149/83 (30 Apr 2021 21:41) (120/80 - 150/82)  RR: 18 (30 Apr 2021 21:41) (16 - 18)  SpO2: 96% (30 Apr 2021 21:41) (96% - 98%)      SUBJECTIVE: Pt seen resting comfortably, still in pain, but less so as per wife who serves as .      Pain: YES  Pain (0-10): 1 to 2 at rest, 3 to 4 with use, 5 or 6 with manipulation              SOB: NO  Chest Pain: NO    Nausea or Vomiting: NO           Flatus or Bowel Movement: YES                Void: YES      General Appearance: Appears well and in NAD  Neck: Supple  Chest: Equal expansion bilaterally  CV: Pulse regular presently  Abdomen: Soft, nontense, appropriate incisional tenderness, dressings clean and dry and intact  Extremities: Skin: Right antecubital fossa with ~ 1.5 cm medial opening and ? small (~2 mm) counter lateral area of additional drainage.  Improving local induration and soft tissue edema with now obvious additional surrounding undermining or tunneling.  Only serosanguinous discharge and no malodor once again.      MEDICATIONS  (STANDING):  cefTRIAXone   IVPB 1000 milliGRAM(s) IV Intermittent every 24 hours  doxazosin 2 milliGRAM(s) Oral daily  loratadine 10 milliGRAM(s) Oral daily      LABS:                        14.1   8.07  )-----------( 312      ( 30 Apr 2021 07:41 )             42.7     04-30    140  |  105  |  11  ----------------------------<  132<H>  3.6   |  28  |  0.91    Ca    8.6      30 Apr 2021 07:41  Mg     2.0     04-30      RADIOLOGY & ADDITIONAL STUDIES: None

## 2021-04-30 NOTE — PROGRESS NOTE ADULT - SUBJECTIVE AND OBJECTIVE BOX
Patient is a 61y old  Male who presents with a chief complaint of Cellulitis with abscess of right antecubital fossa (30 Apr 2021 22:32)      INTERVAL HPI/OVERNIGHT EVENTS:  Pt is seen and examined.  H/O obtained with .  No new complaints.    Pain Location & Control:     MEDICATIONS  (STANDING):  cefTRIAXone   IVPB 1000 milliGRAM(s) IV Intermittent every 24 hours  doxazosin 2 milliGRAM(s) Oral daily  loratadine 10 milliGRAM(s) Oral daily    MEDICATIONS  (PRN):      Allergies    No Known Allergies    Intolerances        CAPILLARY BLOOD GLUCOSE        Cultures  Culture Results:   Moderate Staphylococcus aureus  Rare Klebsiella variicola  Rare Coag Negative Staphylococcus "Susceptibilities not performed" (04-26 @ 13:30)        Culture - Abscess with Gram Stain (collected 04-26-21 @ 13:30)  Source: .Abscess Right elbow  Final Report (05-01-21 @ 09:19):    Moderate Staphylococcus aureus    Rare Klebsiella variicola    Rare Coag Negative Staphylococcus "Susceptibilities not performed"  Organism: Staphylococcus aureus  Klebsiella variicola (05-01-21 @ 09:16)  Organism: Klebsiella variicola (05-01-21 @ 09:16)      -  Amikacin: S <=16      -  Amoxicillin/Clavulanic Acid: I 16/8      -  Ampicillin: R <=8 These ampicillin results predict results for amoxicillin      -  Ampicillin/Sulbactam: S <=4/2 Enterobacter, Citrobacter, and Serratia may develop resistance during prolonged therapy (3-4 days)      -  Aztreonam: S <=4      -  Cefazolin: S <=2 Enterobacter, Citrobacter, and Serratia may develop resistance during prolonged therapy (3-4 days)      -  Cefepime: S <=2      -  Cefoxitin: R >16      -  Ceftriaxone: S <=1 Enterobacter, Citrobacter, and Serratia may develop resistance during prolonged therapy      -  Ciprofloxacin: S <=0.25      -  Ertapenem: S <=0.5      -  Gentamicin: S <=2      -  Imipenem: S <=1      -  Levofloxacin: S <=0.5      -  Meropenem: S <=1      -  Piperacillin/Tazobactam: S <=8      -  Tobramycin: S <=2      -  Trimethoprim/Sulfamethoxazole: S <=0.5/9.5      Method Type: VIVIAN  Organism: Staphylococcus aureus (05-01-21 @ 09:16)      -  Ampicillin/Sulbactam: S <=8/4      -  Cefazolin: S <=4      -  Clindamycin: S <=0.25      -  Erythromycin: S <=0.25      -  Gentamicin: S <=1 Should not be used as monotherapy      -  Oxacillin: S <=0.25      -  Penicillin: R 8      -  RIF- Rifampin: S <=1 Should not be used as monotherapy      -  Tetra/Doxy: S <=1      -  Trimethoprim/Sulfamethoxazole: S <=0.5/9.5      -  Vancomycin: S 1      Method Type: VIVIAN        RADIOLOGY & ADDITIONAL TESTS:    Imaging Personally Reviewed:  [ ] YES  [ ] NO    Consultant(s) Notes Reviewed:  [ ] YES  [ ] NO    Care Discussed with Consultants/Other Providers [ ] YES  [ ] NO

## 2021-04-30 NOTE — PROGRESS NOTE ADULT - ASSESSMENT
60 yo [place] male with hx of HTN presents to the ER with right elbow pain,  and redness. previously  seen at Urgent care , abscess opened and packing placed then addmitted and discharged on Bactrim returns 4/26 with worsening induration and pain    Right Elbow Cellulitis w Abscess  noted Klebsiella and MSSA and discharged on appropriate abx repeat I+D required 4/26  agreed with escalation to IV    - suspect initial problem and currentis adequate site control so now post I+D improving will defer to surgery for assessment of any further site control and wound care plan,     when ready for dc fine to finish course with   Keflex 500mg PO QID with last day 5/6. From ID standpoint potential dc today but pt will need follow up for wound care

## 2021-04-30 NOTE — PROGRESS NOTE ADULT - ASSESSMENT
62 yo male pmh htn admitted for right elbow cellulitis/abscess    #Right Elbow Cellulitis/Abscess  s/p I and D, wound care consulted to assess incision site.   Dr. Pena from ID switched abx to rocephin, recs appreciated. when ready for dischrge may change to keflex 500 q8 till 5/6/21.  wound culture klebsilla and MSSA.  Surgery consult .    #HTN  on doxasosin    #DVT proph  Ambulate  SCDs    Discharge plan- Discharge plan to home  pending surgery clearance.    Plan of care was discuss with patient, all questions were answered, seems understand and in agreement.

## 2021-04-30 NOTE — PROGRESS NOTE ADULT - ASSESSMENT
Pain lessening overall.  Vitals reassuring.  Local changes improving with less erythema and induration.  No melodie purulence, but additional tunneling apparent to me today.  Labs reassuring.  As such, continue pain control, IV abx and local care.  VNS now in place as home packing likely to be challenging and concerns for compliance exist.  Will assess for any further I & D in am to facilitate packing efforts.  I have been told that outpatient Wound Care Center follow-up is already in place.  This seems most reasonable given location of wound and likely additional sloughing to occur.  Patient pleased, wife agrees, they remain willing to proceed as outlined above.

## 2021-05-01 VITALS
DIASTOLIC BLOOD PRESSURE: 85 MMHG | HEART RATE: 81 BPM | OXYGEN SATURATION: 98 % | RESPIRATION RATE: 15 BRPM | SYSTOLIC BLOOD PRESSURE: 128 MMHG | TEMPERATURE: 98 F

## 2021-05-01 LAB
ALBUMIN SERPL ELPH-MCNC: 3.2 G/DL — LOW (ref 3.3–5)
ALP SERPL-CCNC: 79 U/L — SIGNIFICANT CHANGE UP (ref 30–120)
ALT FLD-CCNC: 48 U/L DA — SIGNIFICANT CHANGE UP (ref 10–60)
ANION GAP SERPL CALC-SCNC: 10 MMOL/L — SIGNIFICANT CHANGE UP (ref 5–17)
AST SERPL-CCNC: 26 U/L — SIGNIFICANT CHANGE UP (ref 10–40)
BILIRUB SERPL-MCNC: 0.6 MG/DL — SIGNIFICANT CHANGE UP (ref 0.2–1.2)
BUN SERPL-MCNC: 11 MG/DL — SIGNIFICANT CHANGE UP (ref 7–23)
CALCIUM SERPL-MCNC: 8.6 MG/DL — SIGNIFICANT CHANGE UP (ref 8.4–10.5)
CHLORIDE SERPL-SCNC: 105 MMOL/L — SIGNIFICANT CHANGE UP (ref 96–108)
CO2 SERPL-SCNC: 26 MMOL/L — SIGNIFICANT CHANGE UP (ref 22–31)
CREAT SERPL-MCNC: 0.99 MG/DL — SIGNIFICANT CHANGE UP (ref 0.5–1.3)
CULTURE RESULTS: SIGNIFICANT CHANGE UP
GLUCOSE SERPL-MCNC: 129 MG/DL — HIGH (ref 70–99)
HCT VFR BLD CALC: 43.5 % — SIGNIFICANT CHANGE UP (ref 39–50)
HGB BLD-MCNC: 14.6 G/DL — SIGNIFICANT CHANGE UP (ref 13–17)
MCHC RBC-ENTMCNC: 29.4 PG — SIGNIFICANT CHANGE UP (ref 27–34)
MCHC RBC-ENTMCNC: 33.6 GM/DL — SIGNIFICANT CHANGE UP (ref 32–36)
MCV RBC AUTO: 87.5 FL — SIGNIFICANT CHANGE UP (ref 80–100)
NRBC # BLD: 0 /100 WBCS — SIGNIFICANT CHANGE UP (ref 0–0)
ORGANISM # SPEC MICROSCOPIC CNT: SIGNIFICANT CHANGE UP
PLATELET # BLD AUTO: 313 K/UL — SIGNIFICANT CHANGE UP (ref 150–400)
POTASSIUM SERPL-MCNC: 3.9 MMOL/L — SIGNIFICANT CHANGE UP (ref 3.5–5.3)
POTASSIUM SERPL-SCNC: 3.9 MMOL/L — SIGNIFICANT CHANGE UP (ref 3.5–5.3)
PROT SERPL-MCNC: 7 G/DL — SIGNIFICANT CHANGE UP (ref 6–8.3)
RBC # BLD: 4.97 M/UL — SIGNIFICANT CHANGE UP (ref 4.2–5.8)
RBC # FLD: 12.1 % — SIGNIFICANT CHANGE UP (ref 10.3–14.5)
SODIUM SERPL-SCNC: 141 MMOL/L — SIGNIFICANT CHANGE UP (ref 135–145)
SPECIMEN SOURCE: SIGNIFICANT CHANGE UP
WBC # BLD: 7.82 K/UL — SIGNIFICANT CHANGE UP (ref 3.8–10.5)
WBC # FLD AUTO: 7.82 K/UL — SIGNIFICANT CHANGE UP (ref 3.8–10.5)

## 2021-05-01 PROCEDURE — 86769 SARS-COV-2 COVID-19 ANTIBODY: CPT

## 2021-05-01 PROCEDURE — 80048 BASIC METABOLIC PNL TOTAL CA: CPT

## 2021-05-01 PROCEDURE — 10060 I&D ABSCESS SIMPLE/SINGLE: CPT

## 2021-05-01 PROCEDURE — 85027 COMPLETE CBC AUTOMATED: CPT

## 2021-05-01 PROCEDURE — U0003: CPT

## 2021-05-01 PROCEDURE — 80053 COMPREHEN METABOLIC PANEL: CPT

## 2021-05-01 PROCEDURE — 99285 EMERGENCY DEPT VISIT HI MDM: CPT

## 2021-05-01 PROCEDURE — 87077 CULTURE AEROBIC IDENTIFY: CPT

## 2021-05-01 PROCEDURE — 96375 TX/PRO/DX INJ NEW DRUG ADDON: CPT

## 2021-05-01 PROCEDURE — 87635 SARS-COV-2 COVID-19 AMP PRB: CPT

## 2021-05-01 PROCEDURE — 87070 CULTURE OTHR SPECIMN AEROBIC: CPT

## 2021-05-01 PROCEDURE — 36415 COLL VENOUS BLD VENIPUNCTURE: CPT

## 2021-05-01 PROCEDURE — 99232 SBSQ HOSP IP/OBS MODERATE 35: CPT | Mod: 25

## 2021-05-01 PROCEDURE — U0005: CPT

## 2021-05-01 PROCEDURE — 87205 SMEAR GRAM STAIN: CPT

## 2021-05-01 PROCEDURE — 86140 C-REACTIVE PROTEIN: CPT

## 2021-05-01 PROCEDURE — 85025 COMPLETE CBC W/AUTO DIFF WBC: CPT

## 2021-05-01 PROCEDURE — 99239 HOSP IP/OBS DSCHRG MGMT >30: CPT

## 2021-05-01 PROCEDURE — 10061 I&D ABSCESS COMP/MULTIPLE: CPT

## 2021-05-01 PROCEDURE — 83735 ASSAY OF MAGNESIUM: CPT

## 2021-05-01 PROCEDURE — 87186 SC STD MICRODIL/AGAR DIL: CPT

## 2021-05-01 PROCEDURE — 96374 THER/PROPH/DIAG INJ IV PUSH: CPT

## 2021-05-01 PROCEDURE — 71046 X-RAY EXAM CHEST 2 VIEWS: CPT

## 2021-05-01 RX ORDER — CEPHALEXIN 500 MG
1 CAPSULE ORAL
Qty: 24 | Refills: 0
Start: 2021-05-01 | End: 2021-05-06

## 2021-05-01 RX ADMIN — CEFTRIAXONE 100 MILLIGRAM(S): 500 INJECTION, POWDER, FOR SOLUTION INTRAMUSCULAR; INTRAVENOUS at 08:59

## 2021-05-01 RX ADMIN — LORATADINE 10 MILLIGRAM(S): 10 TABLET ORAL at 11:39

## 2021-05-01 RX ADMIN — Medication 2 MILLIGRAM(S): at 05:51

## 2021-05-01 NOTE — PROGRESS NOTE ADULT - PROVIDER SPECIALTY LIST ADULT
Infectious Disease
Surgery
Hospitalist
Surgery
Hospitalist

## 2021-05-01 NOTE — PROGRESS NOTE ADULT - ASSESSMENT
Cellulitis markedly improved.  No objection now to transition to outpatient care.  Vitals non-suggestive.  Site reassuring without odor and scant serosanguineous drainage.  However, open too small to allow for appropriate packing.  See my I & D note of same day.    May be discharged now to home.  Complete therapy with PO ABX.  VNS to see for local care and teaching on Monday.  Follow-up with Wound Care center on 5/7.  Dressing to stay dry and in place until VNS visit.  Discussed with patient and daughter in detail and at length with her as  per the patient's wish (he defers use of  service.)    Thank you...

## 2021-05-01 NOTE — PROGRESS NOTE ADULT - NSICDXPILOT_GEN_ALL_CORE
[place]

## 2021-05-01 NOTE — PROGRESS NOTE ADULT - ASSESSMENT
60 yo [place] male with hx of HTN presents to the ER with right elbow pain,  and redness. previously seen at Urgent care, abscess opened and packing placed then admitted and discharged on Bactrim returns 4/26 with worsening induration and pain    Right Elbow Cellulitis w Abscess  noted Klebsiella and MSSA and discharged on appropriate abx, repeat I+D required 4/26  agreed with escalation to IV    - suspect initial problem and current is adequate site control so now post I+D improving will defer to surgery for assessment of any further site control and wound care plan     when ready for dc, fine to finish course with Keflex 500mg PO QID with last day 5/6.   Surgery to assess today if any further I&D needed to facilitate packing efforts  From ID standpoint can d/c on above abx pending surgical eval. P has outpt wound care center follow up is in place    Arcelia Pal M.D.  [facility], Division of Infectious Diseases  [phone]  After 5pm on weekdays and all day on weekends - please call [phone]

## 2021-05-01 NOTE — PROGRESS NOTE ADULT - SUBJECTIVE AND OBJECTIVE BOX
[facility], Division of Infectious Diseases  RAYMUNDO Qiu Y. Patel, S. Shah  [phone]  ([phone] - weekdays after 5pm and weekends)    Name: ISAC SCHULTZ  Age/Gender: 61y Male  MRN: [id number]    Interval History:  Patient seen this morning, feeling well, denies any elbow pain.   Wife on facetime. Notes reviewed. Afebrile.     Allergies: No Known Allergies    Objective:  Vitals:   T(F): 97.6 (05-01-21 @ 06:42), Max: 98.3 (04-30-21 @ 17:14)  HR: 67 (05-01-21 @ 06:42) (62 - 80)  BP: 143/84 (05-01-21 @ 06:42) (120/80 - 149/83)  RR: 18 (05-01-21 @ 06:42) (18 - 18)  SpO2: 97% (05-01-21 @ 06:42) (96% - 98%)  Physical Examination:  General: no acute distress, nontoxic appearing  HEENT: NC/AT, anicteric, neck supple  Respiratory: no acc muscle use, breathing comfortably  Cardiovascular: S1 S2 present, normal rate  Gastrointestinal: normal appearing, nondistended  Neuro: AAOx3, no obvious focal deficits  Extremities: R elbow clean dressing, moves R elbow without difficult, no edema  Skin: warm, dry, no visible rash  Psych: appropriate affect and mood for situation  Lines: PIV    Laboratory Studies:  CBC:                       14.6   7.82  )-----------( 313      ( 01 May 2021 08:00 )             43.5     WBC Trend:  7.82 05-01-21 @ 08:00  8.07 04-30-21 @ 07:41  8.21 04-27-21 @ 08:00  8.78 04-26-21 @ 09:16    CMP: 05-01    141  |  105  |  11  ----------------------------<  129<H>  3.9   |  26  |  0.99    Ca    8.6      01 May 2021 08:00  Mg     2.0     04-30    TPro  7.0  /  Alb  3.2<L>  /  TBili  0.6  /  DBili  x   /  AST  26  /  ALT  48  /  AlkPhos  79  05-01    LIVER FUNCTIONS - ( 01 May 2021 08:00 )  Alb: 3.2 g/dL / Pro: 7.0 g/dL / ALK PHOS: 79 U/L / ALT: 48 U/L DA / AST: 26 U/L / GGT: x           Microbiology:  Culture - Abscess with Gram Stain (collected 04-26-21 @ 13:30)  Source: .Abscess Right elbow  Final Report (05-01-21 @ 09:19):    Moderate Staphylococcus aureus    Rare Klebsiella variicola    Rare Coag Negative Staphylococcus "Susceptibilities not performed"  Organism: Staphylococcus aureus  Klebsiella variicola (05-01-21 @ 09:16)  Organism: Klebsiella variicola (05-01-21 @ 09:16)      -  Amikacin: S <=16      -  Amoxicillin/Clavulanic Acid: I 16/8      -  Ampicillin: R <=8 These ampicillin results predict results for amoxicillin      -  Ampicillin/Sulbactam: S <=4/2 Enterobacter, Citrobacter, and Serratia may develop resistance during prolonged therapy (3-4 days)      -  Aztreonam: S <=4      -  Cefazolin: S <=2 Enterobacter, Citrobacter, and Serratia may develop resistance during prolonged therapy (3-4 days)      -  Cefepime: S <=2      -  Cefoxitin: R >16      -  Ceftriaxone: S <=1 Enterobacter, Citrobacter, and Serratia may develop resistance during prolonged therapy      -  Ciprofloxacin: S <=0.25      -  Ertapenem: S <=0.5      -  Gentamicin: S <=2      -  Imipenem: S <=1      -  Levofloxacin: S <=0.5      -  Meropenem: S <=1      -  Piperacillin/Tazobactam: S <=8      -  Tobramycin: S <=2      -  Trimethoprim/Sulfamethoxazole: S <=0.5/9.5      Method Type: VIVIAN  Organism: Staphylococcus aureus (05-01-21 @ 09:16)      -  Ampicillin/Sulbactam: S <=8/4      -  Cefazolin: S <=4      -  Clindamycin: S <=0.25      -  Erythromycin: S <=0.25      -  Gentamicin: S <=1 Should not be used as monotherapy      -  Oxacillin: S <=0.25      -  Penicillin: R 8      -  RIF- Rifampin: S <=1 Should not be used as monotherapy      -  Tetra/Doxy: S <=1      -  Trimethoprim/Sulfamethoxazole: S <=0.5/9.5      -  Vancomycin: S 1      Method Type: VIVIAN    Culture - Abscess with Gram Stain (collected 04-19-21 @ 13:23)  Source: .Abscess Arm - Right  Final Report (04-24-21 @ 12:47):    Few Staphylococcus aureus    Moderate Klebsiella variicola  Organism: Staphylococcus aureus  Klebsiella variicola (04-24-21 @ 12:47)  Organism: Klebsiella variicola (04-24-21 @ 12:47)      -  Amikacin: S <=16      -  Amoxicillin/Clavulanic Acid: S <=8/4      -  Ampicillin: R 16 These ampicillin results predict results for amoxicillin      -  Ampicillin/Sulbactam: S <=4/2 Enterobacter, Citrobacter, and Serratia may develop resistance during prolonged therapy (3-4 days)      -  Aztreonam: S <=4      -  Cefazolin: S <=2 Enterobacter, Citrobacter, and Serratia may develop resistance during prolonged therapy (3-4 days)      -  Cefepime: S <=2      -  Cefoxitin: S <=8      -  Ceftriaxone: S <=1 Enterobacter, Citrobacter, and Serratia may develop resistance during prolonged therapy      -  Ciprofloxacin: S <=0.25      -  Ertapenem: S <=0.5      -  Gentamicin: S <=2      -  Imipenem: S <=1      -  Levofloxacin: S <=0.5      -  Meropenem: S <=1      -  Piperacillin/Tazobactam: S <=8      -  Tobramycin: S <=2      -  Trimethoprim/Sulfamethoxazole: S <=0.5/9.5      Method Type: VIVIAN  Organism: Staphylococcus aureus (04-24-21 @ 12:47)      -  Ampicillin/Sulbactam: S <=8/4      -  Cefazolin: S <=4      -  Clindamycin: S <=0.25      -  Erythromycin: S <=0.25      -  Gentamicin: S <=1 Should not be used as monotherapy      -  Oxacillin: S 0.5      -  Penicillin: R >8      -  RIF- Rifampin: S <=1 Should not be used as monotherapy      -  Tetra/Doxy: S <=1      -  Trimethoprim/Sulfamethoxazole: S <=0.5/9.5      -  Vancomycin: S 2      Method Type: VIVIAN    Culture - Blood (collected 04-18-21 @ 21:14)  Source: .Blood Blood-Peripheral  Final Report (04-23-21 @ 22:01):    No Growth Final    Culture - Blood (collected 04-18-21 @ 21:14)  Source: .Blood Blood-Peripheral  Final Report (04-23-21 @ 22:01):    No Growth Final    Culture - Urine (collected 04-18-21 @ 20:59)  Source: .Urine Clean Catch (Midstream)  Final Report (04-19-21 @ 21:07):    No growth    Radiology: reviewed, no new imaging in past 24h     Medications:  cefTRIAXone   IVPB 1000 milliGRAM(s) IV Intermittent every 24 hours  doxazosin 2 milliGRAM(s) Oral daily  loratadine 10 milliGRAM(s) Oral daily    Antimicrobials:  cefTRIAXone   IVPB 1000 milliGRAM(s) IV Intermittent every 24 hours

## 2021-05-01 NOTE — PROGRESS NOTE ADULT - SUBJECTIVE AND OBJECTIVE BOX
Quiet overnight per patient.  No acute events this am per RN.  Chart reviewed.      Vital Signs Last 24 Hrs  T(C): 36.5 (01 May 2021 11:09), Max: 36.8 (30 Apr 2021 17:14)  T(F): 97.7 (01 May 2021 11:09), Max: 98.3 (30 Apr 2021 17:14)  HR: 66 (01 May 2021 11:09) (62 - 72)  BP: 128/80 (01 May 2021 11:09) (128/80 - 149/83)  RR: 16 (01 May 2021 11:09) (16 - 18)  SpO2: 98% (01 May 2021 11:09) (96% - 98%)      SUBJECTIVE: Pt seen resting comfortably in bed.      Pain: NO           SOB: NO  Chest Pain: NO    Nausea or Vomiting: NO           Flatus or Bowel Movement: YES                Void: YES      General Appearance: Appears well, NAD  Neck: Supple  Chest: Equal expansion bilaterally  CV: Pulse regular presently  Abdomen: Soft, non tender  Extremities: Erythema and induration markedly improved.  However, 1 cm skin opening insufficient to allow packing given extent of SQ undermining.       MEDICATIONS  (STANDING):  cefTRIAXone   IVPB 1000 milliGRAM(s) IV Intermittent every 24 hours  doxazosin 2 milliGRAM(s) Oral daily  loratadine 10 milliGRAM(s) Oral daily      LABS:                        14.6   7.82  )-----------( 313      ( 01 May 2021 08:00 )             43.5     05-01    141  |  105  |  11  ----------------------------<  129<H>  3.9   |  26  |  0.99    Ca    8.6      01 May 2021 08:00  Mg     2.0     04-30    TPro  7.0  /  Alb  3.2<L>  /  TBili  0.6  /  DBili  x   /  AST  26  /  ALT  48  /  AlkPhos  79  05-01      RADIOLOGY & ADDITIONAL STUDIES: No new imaging.

## 2021-05-01 NOTE — PROGRESS NOTE ADULT - ASSESSMENT
60 yo male [facility] h[facility] admitted for right elbow cellulitis/abscess    #Right Elbow Cellulitis/Abscess  s/p I and D, wound care consulted to assess incision site.   Dr. Pena from ID switched abx to rocephin, recs appreciated. when ready for dischrge may change to keflex 500 q8 till 5/6/21.  wound culture klebsilla and MSSA.  Surgery consult .    #HTN  on doxasosin    #DVT proph  Ambulate  SCDs    Discharge plan- Discharge plan to home  pending surgery clearance.    Plan of care was discuss with patient, all questions were answered, seems understand and in agreement.

## 2021-05-01 NOTE — PROGRESS NOTE ADULT - SUBJECTIVE AND OBJECTIVE BOX
Patient is a 61y old  Male who presents with a chief complaint of Cellulitis with abscess of right antecubital fossa (30 Apr 2021 22:32)      INTERVAL HPI/OVERNIGHT EVENTS:  Pt is seen and examined.    Pain Location & Control:     MEDICATIONS  (STANDING):  cefTRIAXone   IVPB 1000 milliGRAM(s) IV Intermittent every 24 hours  doxazosin 2 milliGRAM(s) Oral daily  loratadine 10 milliGRAM(s) Oral daily    MEDICATIONS  (PRN):      Allergies    No Known Allergies    Intolerances      Vital Signs Last 24 Hrs  T(C): 36.4 (01 May 2021 06:42), Max: 36.8 (30 Apr 2021 17:14)  T(F): 97.6 (01 May 2021 06:42), Max: 98.3 (30 Apr 2021 17:14)  HR: 67 (01 May 2021 06:42) (62 - 80)  BP: 143/84 (01 May 2021 06:42) (120/80 - 149/83)  BP(mean): --  RR: 18 (01 May 2021 06:42) (18 - 18)  SpO2: 97% (01 May 2021 06:42) (96% - 98%)        LABS:                        14.6   7.82  )-----------( 313      ( 01 May 2021 08:00 )             43.5     01 May 2021 08:00    141    |  105    |  11     ----------------------------<  129    3.9     |  26     |  0.99     Ca    8.6        01 May 2021 08:00    TPro  7.0    /  Alb  3.2    /  TBili  0.6    /  DBili  x      /  AST  26     /  ALT  48     /  AlkPhos  79     01 May 2021 08:00  CAPILLARY BLOOD GLUCOSE      Cultures  Culture Results:   Moderate Staphylococcus aureus  Rare Klebsiella variicola  Rare Coag Negative Staphylococcus "Susceptibilities not performed" (04-26 @ 13:30)        Culture - Abscess with Gram Stain (collected 04-26-21 @ 13:30)  Source: .Abscess Right elbow  Final Report (05-01-21 @ 09:19):    Moderate Staphylococcus aureus    Rare Klebsiella variicola    Rare Coag Negative Staphylococcus "Susceptibilities not performed"  Organism: Staphylococcus aureus  Klebsiella variicola (05-01-21 @ 09:16)  Organism: Klebsiella variicola (05-01-21 @ 09:16)      -  Amikacin: S <=16      -  Amoxicillin/Clavulanic Acid: I 16/8      -  Ampicillin: R <=8 These ampicillin results predict results for amoxicillin      -  Ampicillin/Sulbactam: S <=4/2 Enterobacter, Citrobacter, and Serratia may develop resistance during prolonged therapy (3-4 days)      -  Aztreonam: S <=4      -  Cefazolin: S <=2 Enterobacter, Citrobacter, and Serratia may develop resistance during prolonged therapy (3-4 days)      -  Cefepime: S <=2      -  Cefoxitin: R >16      -  Ceftriaxone: S <=1 Enterobacter, Citrobacter, and Serratia may develop resistance during prolonged therapy      -  Ciprofloxacin: S <=0.25      -  Ertapenem: S <=0.5      -  Gentamicin: S <=2      -  Imipenem: S <=1      -  Levofloxacin: S <=0.5      -  Meropenem: S <=1      -  Piperacillin/Tazobactam: S <=8      -  Tobramycin: S <=2      -  Trimethoprim/Sulfamethoxazole: S <=0.5/9.5      Method Type: VIVIAN  Organism: Staphylococcus aureus (05-01-21 @ 09:16)      -  Ampicillin/Sulbactam: S <=8/4      -  Cefazolin: S <=4      -  Clindamycin: S <=0.25      -  Erythromycin: S <=0.25      -  Gentamicin: S <=1 Should not be used as monotherapy      -  Oxacillin: S <=0.25      -  Penicillin: R 8      -  RIF- Rifampin: S <=1 Should not be used as monotherapy      -  Tetra/Doxy: S <=1      -  Trimethoprim/Sulfamethoxazole: S <=0.5/9.5      -  Vancomycin: S 1      Method Type: VIVIAN        RADIOLOGY & ADDITIONAL TESTS:    Imaging Personally Reviewed:  [ ] YES  [ ] NO    Consultant(s) Notes Reviewed:  [ ] YES  [ ] NO    Care Discussed with Consultants/Other Providers [x ] YES  [ ] NO

## 2021-05-06 PROBLEM — Z00.00 ENCOUNTER FOR PREVENTIVE HEALTH EXAMINATION: Status: ACTIVE | Noted: 2021-05-06

## 2021-05-07 ENCOUNTER — APPOINTMENT (OUTPATIENT)
Dept: PLASTIC SURGERY | Facility: HOSPITAL | Age: 61
End: 2021-05-07
Payer: COMMERCIAL

## 2021-05-07 ENCOUNTER — OUTPATIENT (OUTPATIENT)
Dept: OUTPATIENT SERVICES | Facility: HOSPITAL | Age: 61
LOS: 1 days | Discharge: HOME | End: 2021-05-07
Payer: COMMERCIAL

## 2021-05-07 ENCOUNTER — APPOINTMENT (OUTPATIENT)
Dept: SURGERY | Facility: HOSPITAL | Age: 61
End: 2021-05-07

## 2021-05-07 VITALS
OXYGEN SATURATION: 96 % | RESPIRATION RATE: 20 BRPM | TEMPERATURE: 97.3 F | HEART RATE: 87 BPM | SYSTOLIC BLOOD PRESSURE: 118 MMHG | BODY MASS INDEX: 25.11 KG/M2 | DIASTOLIC BLOOD PRESSURE: 82 MMHG | HEIGHT: 67 IN | WEIGHT: 160 LBS

## 2021-05-07 VITALS
HEART RATE: 87 BPM | HEIGHT: 67 IN | DIASTOLIC BLOOD PRESSURE: 82 MMHG | BODY MASS INDEX: 25.11 KG/M2 | WEIGHT: 160 LBS | TEMPERATURE: 97.3 F | RESPIRATION RATE: 20 BRPM | SYSTOLIC BLOOD PRESSURE: 118 MMHG

## 2021-05-07 DIAGNOSIS — Z87.09 PERSONAL HISTORY OF OTHER DISEASES OF THE RESPIRATORY SYSTEM: ICD-10-CM

## 2021-05-07 DIAGNOSIS — Z78.9 OTHER SPECIFIED HEALTH STATUS: ICD-10-CM

## 2021-05-07 DIAGNOSIS — S51.009A UNSPECIFIED OPEN WOUND OF UNSPECIFIED ELBOW, INITIAL ENCOUNTER: ICD-10-CM

## 2021-05-07 DIAGNOSIS — Z87.891 PERSONAL HISTORY OF NICOTINE DEPENDENCE: ICD-10-CM

## 2021-05-07 DIAGNOSIS — Z86.79 PERSONAL HISTORY OF OTHER DISEASES OF THE CIRCULATORY SYSTEM: ICD-10-CM

## 2021-05-07 DIAGNOSIS — Z86.39 PERSONAL HISTORY OF OTHER ENDOCRINE, NUTRITIONAL AND METABOLIC DISEASE: ICD-10-CM

## 2021-05-07 PROCEDURE — G0463: CPT

## 2021-05-07 PROCEDURE — 99203 OFFICE O/P NEW LOW 30 MIN: CPT

## 2021-05-07 RX ORDER — DEXAMETHASONE 4 MG/1
500 TABLET ORAL EVERY 6 HOURS
Refills: 0 | Status: ACTIVE | COMMUNITY

## 2021-05-07 RX ORDER — DOXAZOSIN 2 MG/1
2 TABLET ORAL DAILY
Refills: 0 | Status: ACTIVE | COMMUNITY

## 2021-05-07 NOTE — ASSESSMENT
[Verbal] : Verbal [Demo] : Demo [Patient] : Patient [Family member] : Family member [Good - alert, interested, motivated] : Good - alert, interested, motivated [Verbalizes knowledge/Understanding] : Verbalizes knowledge/understanding [Dressing changes] : dressing changes [Skin Care] : skin care [Pressure relief] : pressure relief [Signs and symptoms of infection] : sign and symptoms of infection [How and When to Call] : how and when to call [Off-loading] : off-loading [Patient responsibility to plan of care] : patient responsibility to plan of care [] : Yes [Stable] : stable [Home] : Home [Ambulatory] : Ambulatory [Faxed - Long Term Care/Home Health Agency] : Long Term Care/Home Health Agency: Faxed [FreeTextEntry2] : Alteration in skin integrity- promote optimal skin integrity\par  [FreeTextEntry4] : Dr Ross/ Photos taken\par Pt speaks Sami- pt declines medical translation- Son in Law Anne Marie Ugarte present to provide medical translation for pt\par Pt has skin tag next to wound- Dr Ross recommended having skin tag removed in future after wound has healed- Pt & JOSE ARMANDO verbalize understanding\par F/U to [facility] in 1 week [FreeTextEntry1] : [facility]

## 2021-05-07 NOTE — REVIEW OF SYSTEMS
[Loss Of Hearing] : hearing loss [Negative] : Heme/Lymph [FreeTextEntry3] : wears glasses [FreeTextEntry4] : minor hearing loss [FreeTextEntry6] : asthma [FreeTextEntry5] : hypertensive, hypercholesterolemia [de-identified] : right anticubital wound

## 2021-05-07 NOTE — PLAN
[FreeTextEntry1] : marco 3x/week with DD, Tegaderm\par return 1 week\par patient to finish antibiotic prescriptions\par discussed removal of pedunclated lesion lateral to wound\par 35 minutes spent in review, evaluation and treatment

## 2021-05-07 NOTE — PHYSICAL EXAM
[Normal Breath Sounds] : Normal breath sounds [Normal Heart Sounds] : normal heart sounds [2+] : right 2+ [Alert] : alert [Oriented to Person] : oriented to person [Oriented to Place] : oriented to place [Oriented to Time] : oriented to time [Calm] : calm [de-identified] : WD WN 62 Y/O  male in NAD [de-identified] : LOGAN [de-identified] : supple [de-identified] : wound to subcutaneous tissue right medial anticubital fossa with very clean granulating bed. !cm pedunclated skin lesion lateral to wound [FreeTextEntry1] : Right Arm- Antecubital fossa [FreeTextEntry2] : 1.0 [FreeTextEntry3] : 3.0 [FreeTextEntry4] : 0.1 [de-identified] : Small Serosanguineous [de-identified] : Intact/ skin tag present medial periwound [de-identified] : Bernice/ Dry Dressing [de-identified] : Cleansed with Normal saline\par  [de-identified] : None [de-identified] : None [de-identified] : 100% [de-identified] : No

## 2021-05-07 NOTE — HISTORY OF PRESENT ILLNESS
[FreeTextEntry1] : The wound is located on Right Arm- Pt's JOSE ARMANDO states pt developed redness & swelling in area about 2 weeks ago - pt not sure of how it happened- area worsened so pt went to [facility] ER & was hospitalized from 04/26/21-05/01/21- pt received IV antibiotics & underwent I&D of Abscess- upon discharge pt was prescribed oral antibiotics & [facility] initiated & pt was told to follow up to [facility]

## 2021-05-08 DIAGNOSIS — E78.00 PURE HYPERCHOLESTEROLEMIA, UNSPECIFIED: ICD-10-CM
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
## 2021-05-08 DIAGNOSIS — Z79.899 OTHER LONG TERM (CURRENT) DRUG THERAPY: ICD-10-CM

## 2021-05-08 DIAGNOSIS — I10 ESSENTIAL (PRIMARY) HYPERTENSION: ICD-10-CM

## 2021-05-08 DIAGNOSIS — T81.89XD OTHER COMPLICATIONS OF PROCEDURES, NOT ELSEWHERE CLASSIFIED, SUBSEQUENT ENCOUNTER: ICD-10-CM

## 2021-05-08 DIAGNOSIS — J45.909 UNSPECIFIED ASTHMA, UNCOMPLICATED: ICD-10-CM

## 2021-05-08 DIAGNOSIS — Z87.891 PERSONAL HISTORY OF NICOTINE DEPENDENCE: ICD-10-CM

## 2021-05-11 ENCOUNTER — APPOINTMENT (OUTPATIENT)
Dept: UROLOGY | Facility: CLINIC | Age: 61
End: 2021-05-11

## 2021-05-14 ENCOUNTER — OUTPATIENT (OUTPATIENT)
Dept: OUTPATIENT SERVICES | Facility: HOSPITAL | Age: 61
LOS: 1 days | Discharge: HOME | End: 2021-05-14
Payer: COMMERCIAL

## 2021-05-14 ENCOUNTER — APPOINTMENT (OUTPATIENT)
Dept: SURGERY | Facility: HOSPITAL | Age: 61
End: 2021-05-14
Payer: COMMERCIAL

## 2021-05-14 VITALS
HEIGHT: 67 IN | TEMPERATURE: 97.6 F | DIASTOLIC BLOOD PRESSURE: 71 MMHG | OXYGEN SATURATION: 98 % | HEART RATE: 69 BPM | SYSTOLIC BLOOD PRESSURE: 104 MMHG | RESPIRATION RATE: 20 BRPM | WEIGHT: 160 LBS | BODY MASS INDEX: 25.11 KG/M2

## 2021-05-14 DIAGNOSIS — S51.009D UNSPECIFIED OPEN WOUND OF UNSPECIFIED ELBOW, SUBSEQUENT ENCOUNTER: ICD-10-CM

## 2021-05-14 PROCEDURE — G0463: CPT

## 2021-05-14 PROCEDURE — 99213 OFFICE O/P EST LOW 20 MIN: CPT

## 2021-05-14 NOTE — PHYSICAL EXAM
[Normal Breath Sounds] : Normal breath sounds [Normal Heart Sounds] : normal heart sounds [2+] : left 2+ [Alert] : alert [Oriented to Person] : oriented to person [Calm] : calm [2 x 2] : 2 x 2  [JVD] : no jugular venous distention  [de-identified] : WD/WN in no acute distress. [de-identified] : Within Neyda Limits. [de-identified] : Within Neyda Limits. [de-identified] : Within Neyda Limits. [de-identified] : Right antecubital fossa wound is smaller, clean, base is granular, a pedunculated skin lesion lateral to wound, no acute infection, periwound skin is intact with no cellulitis.  [FreeTextEntry1] : Right Arm- Antecubital Fossa [FreeTextEntry2] : 0.5 [FreeTextEntry3] : 1.6 [FreeTextEntry4] : 0.1 [de-identified] : Serosanguineous [de-identified] : Intact, Skin Tag present Lateral periwound [de-identified] : Bernice [de-identified] : Cleansed with Normal Saline\par Cloth Tape [TWNoteComboBox4] : Small [TWNoteComboBox5] : No [de-identified] : No [de-identified] : None [de-identified] : None [de-identified] : 100% [de-identified] : No [de-identified] : 3x Weekly

## 2021-05-14 NOTE — ASSESSMENT
[Verbal] : Verbal [Patient] : Patient [Good - alert, interested, motivated] : Good - alert, interested, motivated [Verbalizes knowledge/Understanding] : Verbalizes knowledge/understanding [Dressing changes] : dressing changes [Skin Care] : skin care [Signs and symptoms of infection] : sign and symptoms of infection [How and When to Call] : how and when to call [Patient responsibility to plan of care] : patient responsibility to plan of care [Stable] : stable [Home] : Home [Ambulatory] : Ambulatory [Faxed - Long Term Care/Home Health Agency] : Long Term Care/Home Health Agency: Faxed [] : No [FreeTextEntry2] : Restore Skin Integrity\par Infection Control\par Localized wound care\par Maintain acceptable pain levels at satisfactory relief.\par Demonstrates use of both nonpharmacological and pharmacological pain relief strategies [FreeTextEntry4] : MD to do procedure next assessment to remove skin mass lateral to periwound, Auth Submitted.\par F/U to [facility] in 2 weeks [FreeTextEntry1] : Right antecubital fossa wound is healing well, no acute infection\par

## 2021-05-14 NOTE — PLAN
[FreeTextEntry1] : Bernice, dry dressing, excision of skin lesion next visit, return to office in two weeks.\par 25 minutes spent for patient care and medical decision making.\par

## 2021-05-16 DIAGNOSIS — T81.89XD OTHER COMPLICATIONS OF PROCEDURES, NOT ELSEWHERE CLASSIFIED, SUBSEQUENT ENCOUNTER: ICD-10-CM

## 2021-05-16 DIAGNOSIS — Z87.891 PERSONAL HISTORY OF NICOTINE DEPENDENCE: ICD-10-CM

## 2021-05-16 DIAGNOSIS — E78.00 PURE HYPERCHOLESTEROLEMIA, UNSPECIFIED: ICD-10-CM

## 2021-05-16 DIAGNOSIS — I10 ESSENTIAL (PRIMARY) HYPERTENSION: ICD-10-CM

## 2021-05-16 DIAGNOSIS — J45.909 UNSPECIFIED ASTHMA, UNCOMPLICATED: ICD-10-CM
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
## 2021-05-16 DIAGNOSIS — Z79.899 OTHER LONG TERM (CURRENT) DRUG THERAPY: ICD-10-CM

## 2021-05-28 ENCOUNTER — RESULT REVIEW (OUTPATIENT)
Age: 61
End: 2021-05-28

## 2021-05-28 ENCOUNTER — OUTPATIENT (OUTPATIENT)
Dept: OUTPATIENT SERVICES | Facility: HOSPITAL | Age: 61
LOS: 1 days | Discharge: HOME | End: 2021-05-28
Payer: COMMERCIAL

## 2021-05-28 ENCOUNTER — APPOINTMENT (OUTPATIENT)
Dept: SURGERY | Facility: HOSPITAL | Age: 61
End: 2021-05-28
Payer: COMMERCIAL

## 2021-05-28 VITALS
HEIGHT: 67 IN | RESPIRATION RATE: 20 BRPM | TEMPERATURE: 97.1 F | HEART RATE: 62 BPM | WEIGHT: 160 LBS | OXYGEN SATURATION: 100 % | SYSTOLIC BLOOD PRESSURE: 117 MMHG | DIASTOLIC BLOOD PRESSURE: 81 MMHG | BODY MASS INDEX: 25.11 KG/M2

## 2021-05-28 DIAGNOSIS — D23.61 OTHER BENIGN NEOPLASM OF SKIN OF RIGHT UPPER LIMB, INCLUDING SHOULDER: ICD-10-CM

## 2021-05-28 DIAGNOSIS — S51.009D UNSPECIFIED OPEN WOUND OF UNSPECIFIED ELBOW, SUBSEQUENT ENCOUNTER: ICD-10-CM

## 2021-05-28 PROCEDURE — 88304 TISSUE EXAM BY PATHOLOGIST: CPT | Mod: 26

## 2021-05-28 PROCEDURE — 11403 EXC TR-EXT B9+MARG 2.1-3CM: CPT

## 2021-05-28 PROCEDURE — 88304 TISSUE EXAM BY PATHOLOGIST: CPT

## 2021-06-01 ENCOUNTER — APPOINTMENT (OUTPATIENT)
Dept: UROLOGY | Facility: CLINIC | Age: 61
End: 2021-06-01
Payer: COMMERCIAL

## 2021-06-01 VITALS
RESPIRATION RATE: 15 BRPM | BODY MASS INDEX: 23.54 KG/M2 | SYSTOLIC BLOOD PRESSURE: 134 MMHG | TEMPERATURE: 98 F | HEART RATE: 75 BPM | WEIGHT: 150 LBS | DIASTOLIC BLOOD PRESSURE: 79 MMHG | HEIGHT: 67 IN

## 2021-06-01 DIAGNOSIS — R35.1 BENIGN PROSTATIC HYPERPLASIA WITH LOWER URINARY TRACT SYMPMS: ICD-10-CM

## 2021-06-01 DIAGNOSIS — N40.1 BENIGN PROSTATIC HYPERPLASIA WITH LOWER URINARY TRACT SYMPMS: ICD-10-CM

## 2021-06-01 LAB — SURGICAL PATHOLOGY STUDY: SIGNIFICANT CHANGE UP

## 2021-06-01 PROCEDURE — 99072 ADDL SUPL MATRL&STAF TM PHE: CPT

## 2021-06-01 PROCEDURE — 99203 OFFICE O/P NEW LOW 30 MIN: CPT

## 2021-06-01 PROCEDURE — 51798 US URINE CAPACITY MEASURE: CPT

## 2021-06-01 RX ORDER — DOXAZOSIN 4 MG/1
4 TABLET ORAL DAILY
Qty: 90 | Refills: 3 | Status: ACTIVE | COMMUNITY
Start: 2021-06-01 | End: 1900-01-01

## 2021-06-01 NOTE — HISTORY OF PRESENT ILLNESS
[FreeTextEntry1] : Patient presents for evaluation of some urinary issues\par He notes in creased daytime frequency with limited urgency but most bothersome 4-5 times a night. The FOS is OK but can have intermittency and if holds for long time hesitancy. No straining urge incontinence, hematuria or h/o UTIs or retention.\par PVR 30cc\par PSA 1.73

## 2021-06-01 NOTE — PHYSICAL EXAM
[General Appearance - Well Developed] : well developed [General Appearance - Well Nourished] : well nourished [Edema] : no peripheral edema [Exaggerated Use Of Accessory Muscles For Inspiration] : no accessory muscle use [Abdomen Soft] : soft [Abdomen Tenderness] : non-tender [Abdomen Mass (___ Cm)] : no abdominal mass palpated [Abdomen Hernia] : no hernia was discovered [Size ___ (gms)] : size was estimated to be [unfilled] g [Prostate Hard Area Or Nodule Bilaterally] : had no palpable nodules [Rectal Exam - Prostate] : was not indurated [Nl Inspection] : the anus was normal on inspection. [Phimosis] : no phimosis [Balanitis] : no balanitis [Circumcised] : the penis was uncircumcised [Normal] : normal [Scrotum Hydrocele On The Right] : no hydrocele [Scrotum Hydrocele On The Left] : no hydrocele [Testes] : normal [Epididymis] : was normal [Vas Deferens / Spermatic Cord] : was normal [Normal Station and Gait] : the gait and station were normal for the patient's age [] : no rash [No Focal Deficits] : no focal deficits [Oriented To Time, Place, And Person] : oriented to person, place, and time [Inguinal Lymph Nodes Enlarged Bilaterally] : inguinal

## 2021-06-02 DIAGNOSIS — I10 ESSENTIAL (PRIMARY) HYPERTENSION: ICD-10-CM

## 2021-06-02 DIAGNOSIS — E78.00 PURE HYPERCHOLESTEROLEMIA, UNSPECIFIED: ICD-10-CM

## 2021-06-02 DIAGNOSIS — Z79.899 OTHER LONG TERM (CURRENT) DRUG THERAPY: ICD-10-CM

## 2021-06-02 DIAGNOSIS — D23.61 OTHER BENIGN NEOPLASM OF SKIN OF RIGHT UPPER LIMB, INCLUDING SHOULDER: ICD-10-CM

## 2021-06-02 DIAGNOSIS — J45.909 UNSPECIFIED ASTHMA, UNCOMPLICATED: ICD-10-CM

## 2021-06-02 DIAGNOSIS — Z87.891 PERSONAL HISTORY OF NICOTINE DEPENDENCE: ICD-10-CM

## 2021-06-02 LAB
ALBUMIN SERPL ELPH-MCNC: 3.9 G/DL
ALP BLD-CCNC: 77 U/L
ALT SERPL-CCNC: 20 U/L
ANION GAP SERPL CALC-SCNC: 10 MMOL/L
AST SERPL-CCNC: 20 U/L
BILIRUB SERPL-MCNC: 0.7 MG/DL
BUN SERPL-MCNC: 13 MG/DL
CALCIUM SERPL-MCNC: 8.6 MG/DL
CHLORIDE SERPL-SCNC: 107 MMOL/L
CHOLEST SERPL-MCNC: 157 MG/DL
CO2 SERPL-SCNC: 25 MMOL/L
CREAT SERPL-MCNC: 0.83 MG/DL
GLUCOSE SERPL-MCNC: 90 MG/DL
HDLC SERPL-MCNC: 36 MG/DL
LDLC SERPL CALC-MCNC: 87 MG/DL
NONHDLC SERPL-MCNC: 121 MG/DL
POTASSIUM SERPL-SCNC: 4 MMOL/L
PROT SERPL-MCNC: 6.6 G/DL
SODIUM SERPL-SCNC: 141 MMOL/L
TRIGL SERPL-MCNC: 169 MG/DL

## 2021-06-02 NOTE — ASSESSMENT
[Verbal] : Verbal [Patient] : Patient [Good - alert, interested, motivated] : Good - alert, interested, motivated [Verbalizes knowledge/Understanding] : Verbalizes knowledge/understanding [Dressing changes] : dressing changes [Skin Care] : skin care [Signs and symptoms of infection] : sign and symptoms of infection [How and When to Call] : how and when to call [Patient responsibility to plan of care] : patient responsibility to plan of care [] : Yes [Stable] : stable [Home] : Home [Ambulatory] : Ambulatory [Faxed - Long Term Care/Home Health Agency] : Long Term Care/Home Health Agency: Faxed [FreeTextEntry2] : Restore Skin Integrity\par Infection Control\par Localized wound care\par Maintain acceptable pain levels at satisfactory relief.\par Demonstrates use of both nonpharmacological and pharmacological pain relief strategies [FreeTextEntry4] : Pathology obtained and sent to lab\par F/U to [facility] in 1 week [FreeTextEntry1] : [facility]

## 2021-06-02 NOTE — REASON FOR VISIT
[FreeTextEntry5] : Right antecubital fossa skin mass.  [FreeTextEntry4] : There is 3 X 3 cm pedunculated skin mass in right antecubital mass, no ulceration.  [FreeTextEntry3] : Right antecubital fossa skin mass, causing pain and discomfort. [FreeTextEntry6] : Right antecubital fossa skin mass. [FreeTextEntry7] : Right antecubital fossa skin mass.

## 2021-06-02 NOTE — PROCEDURE
[] : No [FreeTextEntry9] : 10:05 [de-identified] : 3 X 3 Cm skin mass right antecubital fossa.  [de-identified] : LIAM Canela [de-identified] : None [FreeTextEntry6] : Right antecubital fossa skin mass, 3 X 3 Cm. [FreeTextEntry7] : Right antecubital fossa skin mass, 3 X 3 Cm. [de-identified] : 5cc 1% Lidocaine plain.  [de-identified] : 3cc [de-identified] : Skin mass

## 2021-06-02 NOTE — PHYSICAL EXAM
[4 x 4] : 4 x 4  [Normal Breath Sounds] : Normal breath sounds [Normal Heart Sounds] : normal heart sounds [2+] : left 2+ [Alert] : alert [Oriented to Person] : oriented to person [Calm] : calm [JVD] : no jugular venous distention  [de-identified] : WD/WN in no acute distress. [de-identified] : Within Neyda Limits. [de-identified] : Within Neyda Limits. [de-identified] : Within Neyda Limits. [de-identified] : Right antecubital fossa wound is smaller, clean, base is granular, a pedunculated skin lesion lateral to wound, measuring 3 X 3 Cm,  no acute infection, periwound skin is intact with no cellulitis.  [FreeTextEntry1] : Right Arm- Antecubital Fossa [FreeTextEntry2] : 0.3 [FreeTextEntry3] : 1.0 [FreeTextEntry4] : 0.1 [de-identified] : Serosanguineous [de-identified] : Intact [de-identified] : Silver Alginate [de-identified] : Cleansed with Normal Saline\par  [de-identified] : Patient tolerated procedure well. 2 sutures placed by MD. [FreeTextEntry7] : Right Antecubital Fossa - Skin tag lateral to wound #1 [de-identified] : 1 cc used [de-identified] : Excision and removal of anticubital fossa skin tag [de-identified] : Silver Alginate [de-identified] : Cleansed with Normal Saline\par  [TWNoteComboBox4] : Small [TWNoteComboBox5] : No [de-identified] : No [de-identified] : None [de-identified] : None [de-identified] : 100% [de-identified] : No [de-identified] : 3x Weekly [TWNoteComboBox9] : Right [de-identified] : 1% Lidocaine Injection [de-identified] : Other

## 2021-06-04 ENCOUNTER — OUTPATIENT (OUTPATIENT)
Dept: OUTPATIENT SERVICES | Facility: HOSPITAL | Age: 61
LOS: 1 days | Discharge: HOME | End: 2021-06-04
Payer: COMMERCIAL

## 2021-06-04 ENCOUNTER — APPOINTMENT (OUTPATIENT)
Dept: SURGERY | Facility: HOSPITAL | Age: 61
End: 2021-06-04
Payer: COMMERCIAL

## 2021-06-04 ENCOUNTER — NON-APPOINTMENT (OUTPATIENT)
Age: 61
End: 2021-06-04

## 2021-06-04 ENCOUNTER — APPOINTMENT (OUTPATIENT)
Dept: INTERNAL MEDICINE | Facility: CLINIC | Age: 61
End: 2021-06-04
Payer: COMMERCIAL

## 2021-06-04 VITALS
BODY MASS INDEX: 25.11 KG/M2 | DIASTOLIC BLOOD PRESSURE: 66 MMHG | OXYGEN SATURATION: 97 % | TEMPERATURE: 97.7 F | RESPIRATION RATE: 20 BRPM | SYSTOLIC BLOOD PRESSURE: 104 MMHG | WEIGHT: 160 LBS | HEART RATE: 82 BPM | HEIGHT: 67 IN

## 2021-06-04 VITALS
HEART RATE: 86 BPM | WEIGHT: 146 LBS | BODY MASS INDEX: 22.91 KG/M2 | HEIGHT: 67 IN | DIASTOLIC BLOOD PRESSURE: 73 MMHG | OXYGEN SATURATION: 96 % | TEMPERATURE: 97.8 F | SYSTOLIC BLOOD PRESSURE: 108 MMHG

## 2021-06-04 VITALS — SYSTOLIC BLOOD PRESSURE: 112 MMHG | DIASTOLIC BLOOD PRESSURE: 68 MMHG

## 2021-06-04 DIAGNOSIS — S51.009D UNSPECIFIED OPEN WOUND OF UNSPECIFIED ELBOW, SUBSEQUENT ENCOUNTER: ICD-10-CM

## 2021-06-04 DIAGNOSIS — Z86.39 PERSONAL HISTORY OF OTHER ENDOCRINE, NUTRITIONAL AND METABOLIC DISEASE: ICD-10-CM

## 2021-06-04 DIAGNOSIS — E78.5 HYPERLIPIDEMIA, UNSPECIFIED: ICD-10-CM

## 2021-06-04 DIAGNOSIS — I10 ESSENTIAL (PRIMARY) HYPERTENSION: ICD-10-CM

## 2021-06-04 DIAGNOSIS — T81.89XD OTHER COMPLICATIONS OF PROCEDURES, NOT ELSEWHERE CLASSIFIED, SUBSEQUENT ENCOUNTER: ICD-10-CM

## 2021-06-04 DIAGNOSIS — L02.413 CUTANEOUS ABSCESS OF RIGHT UPPER LIMB: ICD-10-CM

## 2021-06-04 DIAGNOSIS — J30.89 OTHER ALLERGIC RHINITIS: ICD-10-CM

## 2021-06-04 PROCEDURE — 99072 ADDL SUPL MATRL&STAF TM PHE: CPT

## 2021-06-04 PROCEDURE — G0463: CPT

## 2021-06-04 PROCEDURE — 99214 OFFICE O/P EST MOD 30 MIN: CPT

## 2021-06-04 PROCEDURE — 99024 POSTOP FOLLOW-UP VISIT: CPT

## 2021-06-04 NOTE — PLAN
[FreeTextEntry1] : Sutures removed, pathology is benign, non-healing wound is fully closed, No wound care needed, discharged, RTO PRN.\par 25 minutes spent for patient care and medical decision making.\par

## 2021-06-04 NOTE — HISTORY OF PRESENT ILLNESS
[Spouse] : spouse [Other: _____] : [unfilled] [de-identified] : Pt was hospitalized 2x in 4/2021 for abscess on R forearm.  He was treated with ABx the 1st time and discharged home, but it got worse, then returned to hospital where he had I&D.  He was also treated with ABx, and then discharged home, he has been followed at wound care center every 2 weeks, the wound is healing and no sign of infection at present.  Pt also had a skin tag at the same site removed last week.\par \par Pt continues to have allergy with nasal congestion, mostly in the evening.  He has cough and occ cough up phlegm.  This can interfere with sleep.  He has been taking Claritin and Flonase NS for the last 2 months with minimal improvement.\par \par He has no other complaints.  He has tried to eat healthier and doing some exercise. He lost a couple of lbs since last OV.\par \par Pt saw urologist for nocturia, and the dose of Doxazosin was increased from 2 mg to 4 mg daily, but pt did not start the new dose yet.\par \par Pt also made plans to go back to [place] in 3 days. [FreeTextEntry1] : Pt presented for 3 month f/u.  He had labs done and would like to review results.

## 2021-06-04 NOTE — PHYSICAL EXAM
[No Acute Distress] : no acute distress [Well Nourished] : well nourished [Well Developed] : well developed [Normal Sclera/Conjunctiva] : normal sclera/conjunctiva [PERRL] : pupils equal round and reactive to light [EOMI] : extraocular movements intact [Normal Outer Ear/Nose] : the outer ears and nose were normal in appearance [Normal TMs] : both tympanic membranes were normal [No JVD] : no jugular venous distention [Supple] : supple [No Respiratory Distress] : no respiratory distress  [Clear to Auscultation] : lungs were clear to auscultation bilaterally [Normal Rate] : normal rate  [Regular Rhythm] : with a regular rhythm [Normal S1, S2] : normal S1 and S2 [No Edema] : there was no peripheral edema [No Extremity Clubbing/Cyanosis] : no extremity clubbing/cyanosis [Soft] : abdomen soft [Non Tender] : non-tender [Normal Bowel Sounds] : normal bowel sounds [Normal Supraclavicular Nodes] : no supraclavicular lymphadenopathy [Normal Posterior Cervical Nodes] : no posterior cervical lymphadenopathy [Normal Anterior Cervical Nodes] : no anterior cervical lymphadenopathy [No Joint Swelling] : no joint swelling [Grossly Normal Strength/Tone] : grossly normal strength/tone [Speech Grossly Normal] : speech grossly normal [Normal Affect] : the affect was normal [Alert and Oriented x3] : oriented to person, place, and time [Normal Mood] : the mood was normal [de-identified] : male in stated age,  [de-identified] : Distal to R elbow, pt has a clean surgical site where he had skin tag removed, and site clean sand dry with no sign infection, suture intact.  Next to it, pt also has a small nonhealing ulcer with granulation tissue, also no sign of infection.  Area covered with clean dressing. [de-identified] : no sinus tenderness, nasal turbinates were  mildly congested, the pharynx was not erythematous, but has postnasal drip.  [de-identified] : Pt was fluent in Pashto,

## 2021-06-04 NOTE — VITALS
[Pain related to present condition?] : The patient's  pain is not related to present condition. [] : No [de-identified] : 0

## 2021-06-04 NOTE — ASSESSMENT
[Verbal] : Verbal [Patient] : Patient [Good - alert, interested, motivated] : Good - alert, interested, motivated [Verbalizes knowledge/Understanding] : Verbalizes knowledge/understanding [Dressing changes] : dressing changes [Skin Care] : skin care [Signs and symptoms of infection] : sign and symptoms of infection [How and When to Call] : how and when to call [Patient responsibility to plan of care] : patient responsibility to plan of care [Stable] : stable [Home] : Home [Ambulatory] : Ambulatory [Faxed - Long Term Care/Home Health Agency] : Long Term Care/Home Health Agency: Faxed [] : No [FreeTextEntry2] : Restore Skin Integrity\par Infection Control\par Localized wound care\par Maintain acceptable pain levels at satisfactory relief.\par Demonstrates use of both nonpharmacological and pharmacological pain relief strategies [FreeTextEntry4] : Patient discharged by MD [FreeTextEntry1] : S/P excision of right antecubital mass, non-healing wound, all healed, no acute infection.\par

## 2021-06-04 NOTE — PHYSICAL EXAM
[4 x 4] : 4 x 4  [JVD] : no jugular venous distention  [Normal Breath Sounds] : Normal breath sounds [Normal Heart Sounds] : normal heart sounds [2+] : left 2+ [Alert] : alert [Oriented to Person] : oriented to person [Calm] : calm [de-identified] : WD/WN in no acute distress. [de-identified] : Within Neyda Limits. [de-identified] : Within Neyda Limits. [de-identified] : Within Neyda Limits. [de-identified] : Right antecubital fossa wound is closed, incision has fully healed.  [FreeTextEntry1] :  Arm- Antecubital Fossa - Sutures removed and steri strips applied this visit by MD [de-identified] : Intact [de-identified] : Cleansed with Normal Saline\par  [TWNoteComboBox1] : Right [TWNoteComboBox4] : None [de-identified] : No [TWNoteComboBox5] : No [de-identified] : None [de-identified] : None [de-identified] : 100% [de-identified] : No [TWNoteComboBox9] : Right [de-identified] : 1% Lidocaine Injection [de-identified] : Other

## 2021-06-06 DIAGNOSIS — Z98.890 OTHER SPECIFIED POSTPROCEDURAL STATES: ICD-10-CM

## 2021-06-06 DIAGNOSIS — D23.61 OTHER BENIGN NEOPLASM OF SKIN OF RIGHT UPPER LIMB, INCLUDING SHOULDER: ICD-10-CM

## 2021-06-06 DIAGNOSIS — Z79.899 OTHER LONG TERM (CURRENT) DRUG THERAPY: ICD-10-CM

## 2021-06-06 DIAGNOSIS — E78.00 PURE HYPERCHOLESTEROLEMIA, UNSPECIFIED: ICD-10-CM

## 2021-06-06 DIAGNOSIS — I10 ESSENTIAL (PRIMARY) HYPERTENSION: ICD-10-CM

## 2021-06-06 DIAGNOSIS — Z48.02 ENCOUNTER FOR REMOVAL OF SUTURES: ICD-10-CM

## 2021-06-06 DIAGNOSIS — J45.909 UNSPECIFIED ASTHMA, UNCOMPLICATED: ICD-10-CM

## 2021-06-06 DIAGNOSIS — Z87.891 PERSONAL HISTORY OF NICOTINE DEPENDENCE: ICD-10-CM

## 2021-10-06 PROBLEM — I10 ESSENTIAL HYPERTENSION: Status: ACTIVE | Noted: 2021-03-12

## 2021-11-22 NOTE — PROCEDURE NOTE - NSPATIENTPOSTION_GEN_A_CORE
supine Full Thickness Lip Wedge Repair (Flap) Text: Given the location of the defect and the proximity to free margins a full thickness wedge repair was deemed most appropriate.  Using a sterile surgical marker, the appropriate repair was drawn incorporating the defect and placing the expected incisions perpendicular to the vermilion border.  The vermilion border was also meticulously outlined to ensure appropriate reapproximation during the repair.  The area thus outlined was incised through and through with a #15 scalpel blade.  The muscularis and dermis were reaproximated with deep sutures following hemostasis. Care was taken to realign the vermilion border before proceeding with the superficial closure.  Once the vermilion was realigned the superfical and mucosal closure was finished.

## 2022-08-17 NOTE — ASSESSMENT
[FreeTextEntry1] : bothered enough to trial medication: already om low dose Doxazosin so will titrate over few week to 6mg at night. VS:   BP:   HR: 147  RR: 18  temp: 97.7  O2: 98% on room air VS:   BP: 143/70  HR: 147  RR: 18  temp: 97.7  O2: 98% on room air

## 2024-06-13 RX ORDER — DOXAZOSIN MESYLATE 4 MG
1 TABLET ORAL
Qty: 0 | Refills: 0 | DISCHARGE